# Patient Record
Sex: MALE | Race: WHITE
[De-identification: names, ages, dates, MRNs, and addresses within clinical notes are randomized per-mention and may not be internally consistent; named-entity substitution may affect disease eponyms.]

---

## 2020-10-12 ENCOUNTER — HOSPITAL ENCOUNTER (OUTPATIENT)
Dept: HOSPITAL 47 - EC | Age: 85
Setting detail: OBSERVATION
LOS: 3 days | Discharge: HOME HEALTH | End: 2020-10-15
Attending: HOSPITALIST | Admitting: HOSPITALIST
Payer: MEDICARE

## 2020-10-12 DIAGNOSIS — Z79.01: ICD-10-CM

## 2020-10-12 DIAGNOSIS — I48.0: ICD-10-CM

## 2020-10-12 DIAGNOSIS — I11.0: ICD-10-CM

## 2020-10-12 DIAGNOSIS — I50.22: ICD-10-CM

## 2020-10-12 DIAGNOSIS — Z95.1: ICD-10-CM

## 2020-10-12 DIAGNOSIS — E78.5: ICD-10-CM

## 2020-10-12 DIAGNOSIS — F32.9: ICD-10-CM

## 2020-10-12 DIAGNOSIS — Z79.82: ICD-10-CM

## 2020-10-12 DIAGNOSIS — N39.0: ICD-10-CM

## 2020-10-12 DIAGNOSIS — Z79.4: ICD-10-CM

## 2020-10-12 DIAGNOSIS — A41.51: Primary | ICD-10-CM

## 2020-10-12 DIAGNOSIS — I49.1: ICD-10-CM

## 2020-10-12 DIAGNOSIS — F03.90: ICD-10-CM

## 2020-10-12 DIAGNOSIS — G93.41: ICD-10-CM

## 2020-10-12 DIAGNOSIS — E11.9: ICD-10-CM

## 2020-10-12 DIAGNOSIS — Z95.5: ICD-10-CM

## 2020-10-12 DIAGNOSIS — E87.1: ICD-10-CM

## 2020-10-12 DIAGNOSIS — Z88.0: ICD-10-CM

## 2020-10-12 DIAGNOSIS — Z91.81: ICD-10-CM

## 2020-10-12 DIAGNOSIS — I25.10: ICD-10-CM

## 2020-10-12 DIAGNOSIS — Z79.899: ICD-10-CM

## 2020-10-12 LAB
ALBUMIN SERPL-MCNC: 4 G/DL (ref 3.5–5)
ALP SERPL-CCNC: 97 U/L (ref 38–126)
ALT SERPL-CCNC: 25 U/L (ref 4–49)
ANION GAP SERPL CALC-SCNC: 10 MMOL/L
APTT BLD: 27.4 SEC (ref 22–30)
AST SERPL-CCNC: 18 U/L (ref 17–59)
BASOPHILS # BLD AUTO: 0.2 K/UL (ref 0–0.2)
BASOPHILS NFR BLD AUTO: 1 %
BUN SERPL-SCNC: 26 MG/DL (ref 9–20)
CALCIUM SPEC-MCNC: 10.5 MG/DL (ref 8.4–10.2)
CHLORIDE SERPL-SCNC: 96 MMOL/L (ref 98–107)
CO2 SERPL-SCNC: 29 MMOL/L (ref 22–30)
EOSINOPHIL # BLD AUTO: 0.1 K/UL (ref 0–0.7)
EOSINOPHIL NFR BLD AUTO: 1 %
ERYTHROCYTE [DISTWIDTH] IN BLOOD BY AUTOMATED COUNT: 5.62 M/UL (ref 4.3–5.9)
ERYTHROCYTE [DISTWIDTH] IN BLOOD: 13.9 % (ref 11.5–15.5)
GLUCOSE BLD-MCNC: 124 MG/DL (ref 75–99)
GLUCOSE SERPL-MCNC: 155 MG/DL (ref 74–99)
GLUCOSE UR QL: (no result)
HCT VFR BLD AUTO: 51.6 % (ref 39–53)
HGB BLD-MCNC: 16.8 GM/DL (ref 13–17.5)
INR PPP: 1 (ref ?–1.2)
LYMPHOCYTES # SPEC AUTO: 0.8 K/UL (ref 1–4.8)
LYMPHOCYTES NFR SPEC AUTO: 6 %
MAGNESIUM SPEC-SCNC: 1.8 MG/DL (ref 1.6–2.3)
MCH RBC QN AUTO: 30 PG (ref 25–35)
MCHC RBC AUTO-ENTMCNC: 32.6 G/DL (ref 31–37)
MCV RBC AUTO: 91.9 FL (ref 80–100)
MONOCYTES # BLD AUTO: 0.6 K/UL (ref 0–1)
MONOCYTES NFR BLD AUTO: 4 %
NEUTROPHILS # BLD AUTO: 13 K/UL (ref 1.3–7.7)
NEUTROPHILS NFR BLD AUTO: 88 %
PH UR: 5 [PH] (ref 5–8)
PLATELET # BLD AUTO: 220 K/UL (ref 150–450)
POTASSIUM SERPL-SCNC: 4.4 MMOL/L (ref 3.5–5.1)
PROT SERPL-MCNC: 7.2 G/DL (ref 6.3–8.2)
PT BLD: 10.7 SEC (ref 9–12)
RBC UR QL: 1 /HPF (ref 0–5)
SODIUM SERPL-SCNC: 135 MMOL/L (ref 137–145)
SP GR UR: 1.01 (ref 1–1.03)
SQUAMOUS UR QL AUTO: <1 /HPF (ref 0–4)
UROBILINOGEN UR QL STRIP: <2 MG/DL (ref ?–2)
WBC # BLD AUTO: 14.9 K/UL (ref 3.8–10.6)
WBC # UR AUTO: 48 /HPF (ref 0–5)

## 2020-10-12 PROCEDURE — 83605 ASSAY OF LACTIC ACID: CPT

## 2020-10-12 PROCEDURE — 84443 ASSAY THYROID STIM HORMONE: CPT

## 2020-10-12 PROCEDURE — 99285 EMERGENCY DEPT VISIT HI MDM: CPT

## 2020-10-12 PROCEDURE — 87086 URINE CULTURE/COLONY COUNT: CPT

## 2020-10-12 PROCEDURE — 83735 ASSAY OF MAGNESIUM: CPT

## 2020-10-12 PROCEDURE — 96374 THER/PROPH/DIAG INJ IV PUSH: CPT

## 2020-10-12 PROCEDURE — 71046 X-RAY EXAM CHEST 2 VIEWS: CPT

## 2020-10-12 PROCEDURE — 80053 COMPREHEN METABOLIC PANEL: CPT

## 2020-10-12 PROCEDURE — 87077 CULTURE AEROBIC IDENTIFY: CPT

## 2020-10-12 PROCEDURE — 85610 PROTHROMBIN TIME: CPT

## 2020-10-12 PROCEDURE — 84484 ASSAY OF TROPONIN QUANT: CPT

## 2020-10-12 PROCEDURE — 93005 ELECTROCARDIOGRAM TRACING: CPT

## 2020-10-12 PROCEDURE — 97535 SELF CARE MNGMENT TRAINING: CPT

## 2020-10-12 PROCEDURE — 96376 TX/PRO/DX INJ SAME DRUG ADON: CPT

## 2020-10-12 PROCEDURE — 80048 BASIC METABOLIC PNL TOTAL CA: CPT

## 2020-10-12 PROCEDURE — 81001 URINALYSIS AUTO W/SCOPE: CPT

## 2020-10-12 PROCEDURE — 83880 ASSAY OF NATRIURETIC PEPTIDE: CPT

## 2020-10-12 PROCEDURE — 96365 THER/PROPH/DIAG IV INF INIT: CPT

## 2020-10-12 PROCEDURE — 97162 PT EVAL MOD COMPLEX 30 MIN: CPT

## 2020-10-12 PROCEDURE — 87186 SC STD MICRODIL/AGAR DIL: CPT

## 2020-10-12 PROCEDURE — 70450 CT HEAD/BRAIN W/O DYE: CPT

## 2020-10-12 PROCEDURE — 96366 THER/PROPH/DIAG IV INF ADDON: CPT

## 2020-10-12 PROCEDURE — 85730 THROMBOPLASTIN TIME PARTIAL: CPT

## 2020-10-12 PROCEDURE — 74176 CT ABD & PELVIS W/O CONTRAST: CPT

## 2020-10-12 PROCEDURE — 85025 COMPLETE CBC W/AUTO DIFF WBC: CPT

## 2020-10-12 PROCEDURE — 87040 BLOOD CULTURE FOR BACTERIA: CPT

## 2020-10-12 PROCEDURE — 36415 COLL VENOUS BLD VENIPUNCTURE: CPT

## 2020-10-12 PROCEDURE — 97166 OT EVAL MOD COMPLEX 45 MIN: CPT

## 2020-10-12 PROCEDURE — 93306 TTE W/DOPPLER COMPLETE: CPT

## 2020-10-12 RX ADMIN — IOPAMIDOL PRN ML: 612 INJECTION, SOLUTION INTRAVENOUS at 23:39

## 2020-10-12 RX ADMIN — IOPAMIDOL PRN ML: 612 INJECTION, SOLUTION INTRAVENOUS at 22:39

## 2020-10-12 NOTE — XR
EXAMINATION TYPE: XR chest 2V

 

DATE OF EXAM: 10/12/2020

 

COMPARISON: NONE

 

HISTORY: Weakness

 

TECHNIQUE: 2 views

 

FINDINGS: Heart appears slightly enlarged. There is no heart failure. There are sternal wires. There 
are chest leads. Costophrenic angles are clear. Bony thorax is intact.

 

IMPRESSION: No active cardiopulmonary disease.

## 2020-10-12 NOTE — CT
EXAMINATION TYPE: CT brain wo con

 

DATE OF EXAM: 10/12/2020

 

COMPARISON: None

 

HISTORY: Weakness.

 

CT DLP: 1078.4 mGycm

Automated exposure control for dose reduction was used.

 

Multiple axial sections were obtained of the brain without contrast.

 

There is cerebral cortical atrophy. There is no mass effect nor midline shift. There is no sign of in
tracranial hemorrhage. The calvarium is intact.

 

IMPRESSION:

Cerebral atrophy. No acute intracranial abnormality.

## 2020-10-12 NOTE — ED
General Adult HPI





- General


Chief complaint: Weakness


Stated complaint: fall, weakness


Time Seen by Provider: 10/12/20 17:00


Source: patient, family, RN notes reviewed


Mode of arrival: ambulatory


Limitations: no limitations





- History of Present Illness


Initial comments: 





Patient is a pleasant 66-year-old male presenting to the emergency department 

with fatigue and general weakness.  Patient has exertional dyspnea.  Patient 

does have history of CHF.  No leg pain or leg swelling.  No cough or fever.  

Symptoms do worsen with exertion.  Patient did have a fall and lightly bumped 

his head.  No loss of consciousness.  Patient did have a couple of accidents 

because he cannot make it to the bathroom.





- Related Data


                                Home Medications











 Medication  Instructions  Recorded  Confirmed


 


Acetaminophen/Diphenhydramine 1 tab PO HS 10/12/20 10/12/20





[Tylenol -25mg]   


 


Ammonium Lactate 12%/Aquaphor  1 applic TOPICAL DAILY PRN 10/12/20 10/12/20


 


Apixaban [Eliquis] 5 mg PO BID 10/12/20 10/12/20


 


Aspirin 81 mg PO DAILY 10/12/20 10/12/20


 


Atorvastatin [Lipitor] 40 mg PO HS 10/12/20 10/12/20


 


Canagliflozin [Invokana] 300 mg PO DAILY 10/12/20 10/12/20


 


Cholecalciferol [Vitamin D3 (25 2,000 unit PO BID 10/12/20 10/12/20





Mcg = 1000 Iu)]   


 


Donepezil [Aricept] 10 mg PO HS 10/12/20 10/12/20


 


Ferrous Sulfate [Feosol] 325 mg PO Q48H 10/12/20 10/12/20


 


Furosemide [Lasix] 40 mg PO DAILY 10/12/20 10/12/20


 


Insulin Glargine,Hum.rec.anlog 58 unit SQ DAILY 10/12/20 10/12/20





[Lantus Solostar]   


 


Ketoconazole 2% Cream [Nizoral 2%] 1 applic TOPICAL DAILY PRN 10/12/20 10/12/20


 


Memantine HCl [Memantine HCl ER] 28 mg PO DAILY 10/12/20 10/12/20


 


Nitroglycerin Sl Tabs [Nitrostat] 0.4 mg SL Q5M PRN 10/12/20 10/12/20


 


Nitroglycerin [Nitroglycerin 1 spray TRANSLINGU Q5M PRN 10/12/20 10/12/20





400MCG Spray]   


 


Omeprazole Magnesium [PriLOSEC OTC] 20 mg PO DAILY 10/12/20 10/12/20


 


Sertraline HCl [Zoloft] 50 mg PO HS 10/12/20 10/12/20


 


Sotalol [Betapace] 80 mg PO BID 10/12/20 10/12/20


 


Ubidecarenone [Co Q-10] 100 mg PO HS 10/12/20 10/12/20


 


Vitamin B Complex 1 cap PO DAILY 10/12/20 10/12/20


 


lisinopriL [Zestril] 2.5 mg PO DAILY 10/12/20 10/12/20


 


metFORMIN HCL [Glucophage] 500 mg PO DAILY 10/12/20 10/12/20


 


sitaGLIPtin [Januvia] 100 mg PO DAILY 10/12/20 10/12/20











                                    Allergies











Allergy/AdvReac Type Severity Reaction Status Date / Time


 


Penicillins Allergy  Unknown Verified 10/12/20 19:11





   Childhood  














Review of Systems


ROS Statement: 


Those systems with pertinent positive or pertinent negative responses have been 

documented in the HPI.





ROS Other: All systems not noted in ROS Statement are negative.


Constitutional: Denies: fever


Eyes: Denies: eye pain


ENT: Denies: ear pain


Respiratory: Reports: as per HPI, dyspnea (With exertion).  Denies: cough


Cardiovascular: Denies: chest pain


Endocrine: Reports: fatigue


Gastrointestinal: Denies: abdominal pain


Genitourinary: Denies: urgency


Musculoskeletal: Denies: back pain


Skin: Denies: rash


Neurological: Reports: as per HPI.  Denies: headache, confusion





Past Medical History


Past Medical History: Atrial Fibrillation, Heart Failure, Dementia, Diabetes 

Mellitus


Past Surgical History: Coronary Bypass/CABG


Additional Past Surgical History / Comment(s): Stents


Past Psychological History: Depression


Smoking Status: Never smoker


Past Alcohol Use History: None Reported


Past Drug Use History: None Reported





General Exam


Limitations: no limitations


General appearance: alert, in no apparent distress


Head exam: Present: normocephalic


Eye exam: Present: normal appearance, PERRL


Neck exam: Present: normal inspection.  Absent: tenderness


Respiratory exam: Present: normal lung sounds bilaterally


Cardiovascular Exam: Present: regular rate, normal rhythm


GI/Abdominal exam: Present: soft.  Absent: tenderness


Extremities exam: Present: normal inspection.  Absent: pedal edema, calf 

tenderness


Neurological exam: Present: alert


Psychiatric exam: Present: normal affect, normal mood


Skin exam: Present: normal color





Course


                                   Vital Signs











  10/12/20





  16:51


 


Temperature 98.8 F


 


Pulse Rate 76


 


Respiratory 18





Rate 


 


Blood Pressure 111/62


 


O2 Sat by Pulse 94 L





Oximetry 














EKG Findings





- EKG Comments:


EKG Findings:: Sinus rhythm at 72.  .  .  QT 4:30.  .  Left 

axis.  Inferior Q waves.  Q wave in lead V1.  No acute ST change.





Medical Decision Making





- Medical Decision Making





Patient reevaluated and resting comfortably in bed.  Patient and family updated 

on results and plan.  Urinalysis still pending.  Case was discussed in detail 

with Dr. Gibson, who will admit for hospital call.





- Lab Data


Result diagrams: 


                                 10/12/20 18:34





                                 10/12/20 18:34


                                   Lab Results











  10/12/20 10/12/20 10/12/20 Range/Units





  18:34 18:34 18:34 


 


WBC  14.9 H    (3.8-10.6)  k/uL


 


RBC  5.62    (4.30-5.90)  m/uL


 


Hgb  16.8    (13.0-17.5)  gm/dL


 


Hct  51.6    (39.0-53.0)  %


 


MCV  91.9    (80.0-100.0)  fL


 


MCH  30.0    (25.0-35.0)  pg


 


MCHC  32.6    (31.0-37.0)  g/dL


 


RDW  13.9    (11.5-15.5)  %


 


Plt Count  220    (150-450)  k/uL


 


Neutrophils %  88    %


 


Lymphocytes %  6    %


 


Monocytes %  4    %


 


Eosinophils %  1    %


 


Basophils %  1    %


 


Neutrophils #  13.0 H    (1.3-7.7)  k/uL


 


Lymphocytes #  0.8 L    (1.0-4.8)  k/uL


 


Monocytes #  0.6    (0-1.0)  k/uL


 


Eosinophils #  0.1    (0-0.7)  k/uL


 


Basophils #  0.2    (0-0.2)  k/uL


 


Sodium   135 L   (137-145)  mmol/L


 


Potassium   4.4   (3.5-5.1)  mmol/L


 


Chloride   96 L   ()  mmol/L


 


Carbon Dioxide   29   (22-30)  mmol/L


 


Anion Gap   10   mmol/L


 


BUN   26 H   (9-20)  mg/dL


 


Creatinine   1.07   (0.66-1.25)  mg/dL


 


Est GFR (CKD-EPI)AfAm   73   (>60 ml/min/1.73 sqM)  


 


Est GFR (CKD-EPI)NonAf   63   (>60 ml/min/1.73 sqM)  


 


Glucose   155 H   (74-99)  mg/dL


 


Plasma Lactic Acid Nando    1.5  (0.7-2.0)  mmol/L


 


Calcium   10.5 H   (8.4-10.2)  mg/dL


 


Magnesium   1.8   (1.6-2.3)  mg/dL


 


Total Bilirubin   1.0   (0.2-1.3)  mg/dL


 


AST   18   (17-59)  U/L


 


ALT   25   (4-49)  U/L


 


Alkaline Phosphatase   97   ()  U/L


 


Troponin I     (0.000-0.034)  ng/mL


 


NT-Pro-B Natriuret Pep     pg/mL


 


Total Protein   7.2   (6.3-8.2)  g/dL


 


Albumin   4.0   (3.5-5.0)  g/dL


 


TSH   1.110   (0.465-4.680)  mIU/L


 


Urine Color     


 


Urine Appearance     (Clear)  


 


Urine pH     (5.0-8.0)  


 


Ur Specific Gravity     (1.001-1.035)  


 


Urine Protein     (Negative)  


 


Urine Glucose (UA)     (Negative)  


 


Urine Ketones     (Negative)  


 


Urine Blood     (Negative)  


 


Urine Nitrite     (Negative)  


 


Urine Bilirubin     (Negative)  


 


Urine Urobilinogen     (<2.0)  mg/dL


 


Ur Leukocyte Esterase     (Negative)  


 


Urine RBC     (0-5)  /hpf


 


Urine WBC     (0-5)  /hpf


 


Ur Squamous Epith Cells     (0-4)  /hpf


 


Urine Bacteria     (None)  /hpf


 


Urine Mucus     (None)  /hpf


 


Urine Yeast (Budding)     (None)  /hpf














  10/12/20 10/12/20 10/12/20 Range/Units





  18:34 18:34 19:18 


 


WBC     (3.8-10.6)  k/uL


 


RBC     (4.30-5.90)  m/uL


 


Hgb     (13.0-17.5)  gm/dL


 


Hct     (39.0-53.0)  %


 


MCV     (80.0-100.0)  fL


 


MCH     (25.0-35.0)  pg


 


MCHC     (31.0-37.0)  g/dL


 


RDW     (11.5-15.5)  %


 


Plt Count     (150-450)  k/uL


 


Neutrophils %     %


 


Lymphocytes %     %


 


Monocytes %     %


 


Eosinophils %     %


 


Basophils %     %


 


Neutrophils #     (1.3-7.7)  k/uL


 


Lymphocytes #     (1.0-4.8)  k/uL


 


Monocytes #     (0-1.0)  k/uL


 


Eosinophils #     (0-0.7)  k/uL


 


Basophils #     (0-0.2)  k/uL


 


Sodium     (137-145)  mmol/L


 


Potassium     (3.5-5.1)  mmol/L


 


Chloride     ()  mmol/L


 


Carbon Dioxide     (22-30)  mmol/L


 


Anion Gap     mmol/L


 


BUN     (9-20)  mg/dL


 


Creatinine     (0.66-1.25)  mg/dL


 


Est GFR (CKD-EPI)AfAm     (>60 ml/min/1.73 sqM)  


 


Est GFR (CKD-EPI)NonAf     (>60 ml/min/1.73 sqM)  


 


Glucose     (74-99)  mg/dL


 


Plasma Lactic Acid Nando     (0.7-2.0)  mmol/L


 


Calcium     (8.4-10.2)  mg/dL


 


Magnesium     (1.6-2.3)  mg/dL


 


Total Bilirubin     (0.2-1.3)  mg/dL


 


AST     (17-59)  U/L


 


ALT     (4-49)  U/L


 


Alkaline Phosphatase     ()  U/L


 


Troponin I  0.016    (0.000-0.034)  ng/mL


 


NT-Pro-B Natriuret Pep   1210   pg/mL


 


Total Protein     (6.3-8.2)  g/dL


 


Albumin     (3.5-5.0)  g/dL


 


TSH     (0.465-4.680)  mIU/L


 


Urine Color    Yellow  


 


Urine Appearance    Cloudy  (Clear)  


 


Urine pH    5.0  (5.0-8.0)  


 


Ur Specific Gravity    1.014  (1.001-1.035)  


 


Urine Protein    Negative  (Negative)  


 


Urine Glucose (UA)    4+ H  (Negative)  


 


Urine Ketones    Negative  (Negative)  


 


Urine Blood    Negative  (Negative)  


 


Urine Nitrite    Negative  (Negative)  


 


Urine Bilirubin    Negative  (Negative)  


 


Urine Urobilinogen    <2.0  (<2.0)  mg/dL


 


Ur Leukocyte Esterase    Large H  (Negative)  


 


Urine RBC    1  (0-5)  /hpf


 


Urine WBC    48 H  (0-5)  /hpf


 


Ur Squamous Epith Cells    <1  (0-4)  /hpf


 


Urine Bacteria    Many H  (None)  /hpf


 


Urine Mucus    Rare H  (None)  /hpf


 


Urine Yeast (Budding)    Occasional H  (None)  /hpf














- Radiology Data


Radiology results: report reviewed (Computed tomography scan of the brain shows 

atrophy.  No acute process.), image reviewed (Chest x-ray shows no acute 

process.  )





Disposition


Clinical Impression: 


 Weakness, Ataxia, Urinary tract infection





Disposition: ADMITTED AS IP TO THIS HOSP


Is patient prescribed a controlled substance at d/c from ED?: No


Referrals: 


Nonstaff,Physician [Primary Care Provider] - 1-2 days


Decision Time: 19:54

## 2020-10-13 LAB
ANION GAP SERPL CALC-SCNC: 11.2 MMOL/L (ref 4–12)
BASOPHILS # BLD AUTO: 0.1 K/UL (ref 0–0.2)
BASOPHILS NFR BLD AUTO: 1 %
BUN SERPL-SCNC: 29 MG/DL (ref 9–27)
BUN/CREAT SERPL: 29 RATIO (ref 12–20)
CALCIUM SPEC-MCNC: 10.1 MG/DL (ref 8.7–10.3)
CHLORIDE SERPL-SCNC: 97 MMOL/L (ref 96–109)
CO2 SERPL-SCNC: 26.8 MMOL/L (ref 21.6–31.8)
EOSINOPHIL # BLD AUTO: 0 K/UL (ref 0–0.7)
EOSINOPHIL NFR BLD AUTO: 0 %
ERYTHROCYTE [DISTWIDTH] IN BLOOD BY AUTOMATED COUNT: 5.36 M/UL (ref 4.3–5.9)
ERYTHROCYTE [DISTWIDTH] IN BLOOD: 14.1 % (ref 11.5–15.5)
GLUCOSE BLD-MCNC: 114 MG/DL (ref 75–99)
GLUCOSE BLD-MCNC: 114 MG/DL (ref 75–99)
GLUCOSE BLD-MCNC: 125 MG/DL (ref 75–99)
GLUCOSE BLD-MCNC: 191 MG/DL (ref 75–99)
GLUCOSE SERPL-MCNC: 113 MG/DL (ref 70–110)
HCT VFR BLD AUTO: 49.8 % (ref 39–53)
HGB BLD-MCNC: 15.7 GM/DL (ref 13–17.5)
LYMPHOCYTES # SPEC AUTO: 1 K/UL (ref 1–4.8)
LYMPHOCYTES NFR SPEC AUTO: 9 %
MCH RBC QN AUTO: 29.3 PG (ref 25–35)
MCHC RBC AUTO-ENTMCNC: 31.6 G/DL (ref 31–37)
MCV RBC AUTO: 92.9 FL (ref 80–100)
MONOCYTES # BLD AUTO: 0.6 K/UL (ref 0–1)
MONOCYTES NFR BLD AUTO: 6 %
NEUTROPHILS # BLD AUTO: 8.8 K/UL (ref 1.3–7.7)
NEUTROPHILS NFR BLD AUTO: 82 %
PLATELET # BLD AUTO: 212 K/UL (ref 150–450)
POTASSIUM SERPL-SCNC: 4.1 MMOL/L (ref 3.5–5.5)
SODIUM SERPL-SCNC: 135 MMOL/L (ref 135–145)
WBC # BLD AUTO: 10.6 K/UL (ref 3.8–10.6)

## 2020-10-13 RX ADMIN — INSULIN DETEMIR SCH UNIT: 100 INJECTION, SOLUTION SUBCUTANEOUS at 08:53

## 2020-10-13 RX ADMIN — APIXABAN SCH MG: 2.5 TABLET, FILM COATED ORAL at 21:36

## 2020-10-13 RX ADMIN — ASPIRIN SCH: 325 TABLET ORAL at 09:08

## 2020-10-13 RX ADMIN — INSULIN ASPART SCH: 100 INJECTION, SOLUTION INTRAVENOUS; SUBCUTANEOUS at 11:37

## 2020-10-13 RX ADMIN — ATORVASTATIN CALCIUM SCH MG: 40 TABLET, FILM COATED ORAL at 21:37

## 2020-10-13 RX ADMIN — INSULIN ASPART SCH: 100 INJECTION, SOLUTION INTRAVENOUS; SUBCUTANEOUS at 17:22

## 2020-10-13 RX ADMIN — FUROSEMIDE SCH MG: 40 TABLET ORAL at 08:54

## 2020-10-13 RX ADMIN — DONEPEZIL HYDROCHLORIDE SCH MG: 10 TABLET ORAL at 21:37

## 2020-10-13 RX ADMIN — SERTRALINE HYDROCHLORIDE SCH MG: 50 TABLET, FILM COATED ORAL at 21:36

## 2020-10-13 RX ADMIN — INSULIN ASPART SCH: 100 INJECTION, SOLUTION INTRAVENOUS; SUBCUTANEOUS at 08:07

## 2020-10-13 RX ADMIN — CEFAZOLIN SCH MLS/HR: 330 INJECTION, POWDER, FOR SOLUTION INTRAMUSCULAR; INTRAVENOUS at 21:38

## 2020-10-13 RX ADMIN — Medication SCH: at 00:55

## 2020-10-13 RX ADMIN — PANTOPRAZOLE SODIUM SCH MG: 40 TABLET, DELAYED RELEASE ORAL at 08:54

## 2020-10-13 RX ADMIN — SOTALOL HYDROCHLORIDE SCH MG: 80 TABLET ORAL at 08:55

## 2020-10-13 RX ADMIN — CEFAZOLIN SCH: 330 INJECTION, POWDER, FOR SOLUTION INTRAMUSCULAR; INTRAVENOUS at 06:04

## 2020-10-13 RX ADMIN — LINAGLIPTIN SCH MG: 5 TABLET, FILM COATED ORAL at 08:54

## 2020-10-13 RX ADMIN — ASPIRIN 81 MG CHEWABLE TABLET SCH MG: 81 TABLET CHEWABLE at 08:54

## 2020-10-13 RX ADMIN — INSULIN ASPART SCH UNIT: 100 INJECTION, SOLUTION INTRAVENOUS; SUBCUTANEOUS at 21:37

## 2020-10-13 RX ADMIN — Medication SCH UNIT: at 21:37

## 2020-10-13 RX ADMIN — Medication SCH UNIT: at 08:54

## 2020-10-13 RX ADMIN — MEMANTINE HYDROCHLORIDE SCH MG: 10 TABLET ORAL at 21:37

## 2020-10-13 RX ADMIN — SOTALOL HYDROCHLORIDE SCH MG: 80 TABLET ORAL at 21:37

## 2020-10-13 RX ADMIN — Medication SCH MG: at 21:37

## 2020-10-13 RX ADMIN — MEMANTINE HYDROCHLORIDE SCH MG: 10 TABLET ORAL at 08:54

## 2020-10-13 NOTE — P.CRDCN
History of Present Illness


Consult date: 10/13/20


Consult reason: congestive heart failure


Chief complaint: Fatigue and weakness, dizziness, questionable fall


History of present illness: 


This is a pleasant 86-year-old  gentleman with documented history of 

hypertension, diabetes, chronic heart failure, atrial fibrillation, dementia, 

coronary artery disease with prior bypass surgery and stent placement, 

hyperlipidemia, who presented to the hospital with symptoms of dizziness, 

fatigue and weakness.  Patient also states he is unsure but he may have had a 

fall.  He does answer some questions appropriately, but is somewhat confused on 

other topics.  He states that he follows with Dr. Jeffries out of town as his 

cardiologist.  CAT scan of the brain did not reveal any acute abnormality on 

presentation here, chest x-ray did not reveal any acute process EKG showed 

normal sinus rhythm with PACs.  Abdominal pelvis CT showed some bladder wall 

thickening.  Positive UTI.  Blood pressure 110/80 with a heart rate in the 70s 

93% on room air.  White blood cell count 14.9, hemoglobin 16.8, platelet count 

220 this morning 10.6 15.7 and 212.  Sodium 135, potassium 4.1, BUN 29 

creatinine 1.0 troponins 0.01 6.01 9.020 and a BNP level of 1210.  Cardiology 

consultation was requested for congestive heart failure.  Patient is lying 

comfortably in bed this morning at the time of examination, no shortness of 

breath.








Past Medical History


Past Medical History: Atrial Fibrillation, Heart Failure, Dementia, Diabetes 

Mellitus


Additional Past Medical History / Comment(s): CHF


History of Any Multi-Drug Resistant Organisms: None Reported


Past Surgical History: Coronary Bypass/CABG


Additional Past Surgical History / Comment(s): Stents


Past Anesthesia/Blood Transfusion Reactions: No Reported Reaction


Past Psychological History: Depression


Smoking Status: Never smoker


Past Alcohol Use History: None Reported


Past Drug Use History: None Reported





- Past Family History


  ** Father


History Unknown: Yes





Medications and Allergies


                                Home Medications











 Medication  Instructions  Recorded  Confirmed  Type


 


Acetaminophen/Diphenhydramine 1 tab PO HS 10/12/20 10/12/20 History





[Tylenol -25mg]    


 


Ammonium Lactate 12%/Aquaphor  1 applic TOPICAL DAILY PRN 10/12/20 10/12/20 

History


 


Apixaban [Eliquis] 5 mg PO BID 10/12/20 10/12/20 History


 


Aspirin 81 mg PO DAILY 10/12/20 10/12/20 History


 


Atorvastatin [Lipitor] 40 mg PO HS 10/12/20 10/12/20 History


 


Canagliflozin [Invokana] 300 mg PO DAILY 10/12/20 10/12/20 History


 


Cholecalciferol [Vitamin D3 (25 2,000 unit PO BID 10/12/20 10/12/20 History





Mcg = 1000 Iu)]    


 


Donepezil [Aricept] 10 mg PO HS 10/12/20 10/12/20 History


 


Ferrous Sulfate [Feosol] 325 mg PO Q48H 10/12/20 10/12/20 History


 


Furosemide [Lasix] 40 mg PO DAILY 10/12/20 10/12/20 History


 


Insulin Glargine,Hum.rec.anlog 58 unit SQ DAILY 10/12/20 10/12/20 History





[Lantus Solostar]    


 


Ketoconazole 2% Cream [Nizoral 2%] 1 applic TOPICAL DAILY PRN 10/12/20 10/12/20 

History


 


Memantine HCl [Memantine HCl ER] 28 mg PO DAILY 10/12/20 10/12/20 History


 


Nitroglycerin Sl Tabs [Nitrostat] 0.4 mg SL Q5M PRN 10/12/20 10/12/20 History


 


Nitroglycerin [Nitroglycerin 1 spray TRANSLINGU Q5M PRN 10/12/20 10/12/20 

History





400MCG Spray]    


 


Omeprazole Magnesium [PriLOSEC OTC] 20 mg PO DAILY 10/12/20 10/12/20 History


 


Sertraline HCl [Zoloft] 50 mg PO HS 10/12/20 10/12/20 History


 


Sotalol [Betapace] 80 mg PO BID 10/12/20 10/12/20 History


 


Ubidecarenone [Co Q-10] 100 mg PO HS 10/12/20 10/12/20 History


 


Vitamin B Complex 1 cap PO DAILY 10/12/20 10/12/20 History


 


lisinopriL [Zestril] 2.5 mg PO DAILY 10/12/20 10/12/20 History


 


metFORMIN HCL [Glucophage] 500 mg PO DAILY 10/12/20 10/12/20 History


 


sitaGLIPtin [Januvia] 100 mg PO DAILY 10/12/20 10/12/20 History








                                    Allergies











Allergy/AdvReac Type Severity Reaction Status Date / Time


 


Penicillins Allergy  Unknown Verified 10/12/20 19:11





   Childhood  














Physical Exam


Vitals: 


                                   Vital Signs











  Temp Pulse Pulse Resp BP BP BP


 


 10/13/20 07:00  98.2 F   76    110/86 


 


 10/13/20 04:00     17   


 


 10/13/20 01:00  98 F   74  20   106/83 


 


 10/12/20 23:39     18   


 


 10/12/20 22:10  98.9 F   63  20    113/63


 


 10/12/20 21:30     18   


 


 10/12/20 21:01  98.8 F      


 


 10/12/20 20:00   88   20  114/72  


 


 10/12/20 18:59   70   20  149/65  


 


 10/12/20 16:51  98.8 F  76   18  111/62  














  Pulse Ox


 


 10/13/20 07:00  93 L


 


 10/13/20 04:00 


 


 10/13/20 01:00  92 L


 


 10/12/20 23:39 


 


 10/12/20 22:10  94 L


 


 10/12/20 21:30 


 


 10/12/20 21:01 


 


 10/12/20 20:00  98


 


 10/12/20 18:59  94 L


 


 10/12/20 16:51  94 L








                                Intake and Output











 10/12/20 10/13/20 10/13/20





 22:59 06:59 14:59


 


Intake Total 100 100 


 


Output Total   250


 


Balance 100 100 -250


 


Intake:   


 


  Oral 100 100 


 


Output:   


 


  Urine   250


 


Other:   


 


  Voiding Method Urinal Urinal Urinal


 


  # Voids 2 2 


 


  Weight 81.647 kg 80 kg 














PHYSICAL EXAMINATION: 





GENERAL: 86-year-old  gentleman in no acute distress at the time of my 

examination





HEENT: Head is atraumatic, normocephalic.  Pupils equal, round.  Sclera 

anicteric. Conjunctiva are clear.  Mucous membranes of the mouth are moist.  

Neck is supple.  There is no elevated jugular venous pressure.  No carotid  

bruit is heard.





HEART EXAMINATION: S1-S2 with systolic murmur is heard





CHEST EXAMINATION: Lungs are clear to auscultation and precussion. No chest wall

 tenderness is noted on palpation or with deep breathing.





ABDOMEN:  Soft, nontender. Bowel sounds are heard. No organomegaly noted.


 


EXTREMITIES: 2+ peripheral pulses with no evidence of peripheral edema and no 

calf tenderness noted.





NEUROLOGIC patient is awake, alert and oriented 2 .


 


.


 








Results





                                 10/13/20 05:31





                                 10/13/20 05:31


                                 Cardiac Enzymes











  10/12/20 10/12/20 10/12/20 Range/Units





  18:34 18:34 21:10 


 


AST  18    (17-59)  U/L


 


Troponin I   0.016  0.019  (0.000-0.034)  ng/mL














  10/13/20 Range/Units





  00:59 


 


AST   (17-59)  U/L


 


Troponin I  0.020  (0.000-0.034)  ng/mL








                                   Coagulation











  10/12/20 Range/Units





  18:34 


 


PT  10.7  (9.0-12.0)  sec


 


APTT  27.4  (22.0-30.0)  sec








                                       CBC











  10/12/20 10/13/20 Range/Units





  18:34 05:31 


 


WBC  14.9 H  10.6  (3.8-10.6)  k/uL


 


RBC  5.62  5.36  (4.30-5.90)  m/uL


 


Hgb  16.8  15.7  (13.0-17.5)  gm/dL


 


Hct  51.6  49.8  (39.0-53.0)  %


 


Plt Count  220  212  (150-450)  k/uL








                          Comprehensive Metabolic Panel











  10/12/20 10/13/20 Range/Units





  18:34 05:31 


 


Sodium  135 L  135  (137-145)  mmol/L


 


Potassium  4.4  4.1  (3.5-5.1)  mmol/L


 


Chloride  96 L  97  ()  mmol/L


 


Carbon Dioxide  29  26.8  (22-30)  mmol/L


 


BUN  26 H  29.0 H  (9-20)  mg/dL


 


Creatinine  1.07  1.0  (0.66-1.25)  mg/dL


 


Glucose  155 H  113 H  (74-99)  mg/dL


 


Calcium  10.5 H  10.1  (8.4-10.2)  mg/dL


 


AST  18   (17-59)  U/L


 


ALT  25   (4-49)  U/L


 


Alkaline Phosphatase  97   ()  U/L


 


Total Protein  7.2   (6.3-8.2)  g/dL


 


Albumin  4.0   (3.5-5.0)  g/dL








                               Current Medications











Generic Name Dose Route Start Last Admin





  Trade Name Freq  PRN Reason Stop Dose Admin


 


Apixaban  2.5 mg  10/13/20 21:00 





  Apixaban 2.5 Mg Tablet  PO  





  BID GREGG  


 


Aspirin  81 mg  10/13/20 09:00  10/13/20 08:54





  Aspirin 81 Mg  PO   81 mg





  DAILY GREGG   Administration


 


Atorvastatin Calcium  40 mg  10/13/20 21:00 





  Atorvastatin 40 Mg Tab  PO  





  HS GREGG  


 


Cholecalciferol  2,000 unit  10/13/20 09:00  10/13/20 08:54





  Cholecalciferol 1,000 Unit Tab  PO   2,000 unit





  BID GREGG   Administration


 


Clotrimazole  1 applic  10/12/20 21:31 





  Clotrimazole 1% Cream 15 Gm Tube  TOPICAL  





  DAILY PRN  





  rash on leg  


 


Donepezil HCl  10 mg  10/13/20 21:00 





  Donepezil 10 Mg Tab  PO  





  HS [facility]  


 


Ferrous Sulfate  325 mg  10/14/20 09:00 





  Ferrous Sulfate 325 Mg Tab  PO  





  Q48H [facility]  


 


Furosemide  40 mg  10/13/20 09:00  10/13/20 08:54





  Furosemide 40 Mg Tab  PO   40 mg





  DAILY [facility]   Administration


 


Sodium Chloride  1,000 mls @ 20 mls/hr  10/12/20 20:00  10/13/20 06:04





  Saline 0.9%  IV   Not Given





  .Q24H [facility]  


 


Ceftriaxone Sodium 1 gm/  50 mls @ 100 mls/hr  10/13/20 09:00  10/13/20 08:54





  Sodium Chloride  IVPB   100 mls/hr





  Q12HR [facility]   Administration


 


Insulin Aspart  0 unit  10/13/20 07:30  10/13/20 11:37





  Insulin Aspart (Novolog) 100 Unit/Ml Vial  SQ   Not Given





  [facility]  





  Protocol  


 


Insulin Detemir  58 unit  10/13/20 07:00  10/13/20 08:53





  Insulin Detemir (Levemir) 100 Unit/Ml Syr  SQ   58 unit





  DAILY@0700 [facility]   Administration


 


Iopamidol  30 ml  10/12/20 21:36 





  Iopamidol Contrast (Oral Use) Vial  PO  10/13/20 21:36 





  ONCE PRN  





  CT Scan  


 


Lactic Acid  1 applic  10/12/20 21:31 





  Ammonium Lactate 12% Cream 140 Gm Tube  TOPICAL  





  DAILY PRN  





  rash on leg  


 


Linagliptin  5 mg  10/13/20 09:00  10/13/20 08:54





  Linagliptin 5 Mg Tablet  PO   5 mg





  DAILY [facility]   Administration


 


Lisinopril  2.5 mg  10/13/20 09:00  10/13/20 08:54





  Lisinopril 2.5 Mg Tab  PO   2.5 mg





  DAILY GREGG   Administration


 


Melatonin  1 mg  10/12/20 22:30  10/13/20 00:55





  Melatonin 1 Mg Tab  PO   Not Given





  HS GREGG  


 


Memantine  10 mg  10/13/20 09:00  10/13/20 08:54





  Memantine 10 Mg Tab  PO   10 mg





  BID GREGG   Administration


 


Metformin HCl  500 mg  10/15/20 09:00 





  Metformin 500 Mg Tab  PO  





  DAILY GREGG  


 


Naloxone HCl  0.2 mg  10/12/20 19:54 





  Naloxone 0.4 Mg/Ml 1 Ml Vial  IV  





  Q2M PRN  





  Opioid Reversal  


 


Nitroglycerin  0.4 mg  10/12/20 21:31 





  Nitroglycerin Sl Tabs 0.4 Mg Tab  SUBLINGUAL  





  Q5M PRN  





  Chest Pain  


 


Patient's Own (  300 mg  10/13/20 09:00  10/13/20 09:08





Canagliflozin [  PO   Not Given





Invokana] 300 Mg  DAILY GREGG  





Tablet)   


 


Pantoprazole Sodium  40 mg  10/13/20 07:30  10/13/20 08:54





  Pantoprazole 40 Mg Tablet  PO   40 mg





  0730 GREGG   Administration


 


Sertraline HCl  50 mg  10/13/20 21:00 





  Sertraline 50 Mg Tab  PO  





  HS GREGG  


 


Sotalol HCl  80 mg  10/13/20 09:00  10/13/20 08:55





  Sotalol 80 Mg Tab  PO   80 mg





  BID GREGG   Administration








                                Intake and Output











 10/12/20 10/13/20 10/13/20





 22:59 06:59 14:59


 


Intake Total 100 100 


 


Output Total   250


 


Balance 100 100 -250


 


Intake:   


 


  Oral 100 100 


 


Output:   


 


  Urine   250


 


Other:   


 


  Voiding Method Urinal Urinal Urinal


 


  # Voids 2 2 


 


  Weight 81.647 kg 80 kg 








                                        





                                 10/13/20 05:31 





                                 10/13/20 05:31 











EKG Interpretations (text)


EKG shows a normal sinus rhythm with occasional PACs








Assessment and Plan


Plan: 


Assessment and plan


#1 UTI/sepsis


#2 symptoms of dizziness, fatigue and weakness, could be secondary to UTI


#3 no clear-cut evidence of congestive cardiac failure


#4 paroxysmal atrial fibrillation


#5 dementia


#6 diabetes


#7 hypertension


#8 hyperlipidemia


#9 coronary artery disease with prior bypass surgery and stenting





Plan


We will obtain an echocardiogram with Doppler study.  Decrease the patient's 

Eliquis 2-1/2 mg one tablet by mouth twice a day.  We will continue with the 

oral diuretics.  Further recommendations to follow.





DNP note has been reviewed, I agree with a documented findings and plan of care.

 Patient was seen and examined.

## 2020-10-13 NOTE — HP
HISTORY AND PHYSICAL



CHIEF COMPLAINTS:

Weakness, shortness of breath, fall.



HISTORY OF PRESENT ILLNESS:

This 86-year-old gentleman with a past medical history of multiple medical problems

including history of atrial fibrillation, CHF, dementia, diabetes mellitus, CAD, CABG,

stents, being followed by primary physician and Cardiology elsewhere recently moved to

the area. The patient is complaining of some fatigue and generalized weakness and

shortness of breath.  The patient had multiple falls also.  The patient came to [facility].  The patient also noted abdominal distention and the patient admitted

for further evaluation and treatment. At the time of admission, UA showed abnormality

indicating possible UTI and sepsis.  Otherwise, chest x-ray showed no significant fluid

overload, but NT proBNP is elevated at 1210. There is no history of any fever or

rigors. No history of headache, loss of consciousness, seizures.  Patient is unable to

give coherent history.  Most of the history is taken from my discussion with staff,

discussion with the ER physician and review of the chart and discussion with the

family, the daughter, at the bedside.



PAST MEDICAL HISTORY:

History of atrial fibrillation, CHF, dementia, diabetes mellitus type 2, CAD CABG.



MEDICATIONS:

Home medications are:

1. Nizoral.

2. Ammonia lactate.

3. Januvia.

4. Glucophage.

5. Zestril.

6. Vitamin B complex.

7. Coenzyme Q10.

8. Betapace.

9. Zoloft.

10.Prilosec.

11.Nitrostat.

12.Nitroglycerin.

13.Namenda.

14.Lantus.

15.Lasix.

16.Iron Sulfate.

17.Aricept.

18.Vitamin D3.

19.Invokana.

20.Lipitor.

21.Aspirin.

22.Eliquis.

23.Tylenol.



ALLERGIES:

PENICILLIN.



FAMILY HISTORY:

No history of heart disease or strokes in the family.



SOCIAL HISTORY:

No history of smoking.  No history of alcohol.



REVIEW OF SYSTEMS:

Could not be taken, the patient is mildly confused.



PHYSICAL EXAMINATION:

Pulse 76, blood pressure 111/62, respiration 18, temperature 98.8, pulse ox 94% on 2 L.

HEENT:  Conjunctivae normal.  Oral mucosa moist.

NECK: No jugular venous distention. No carotid bruit. No lymph node enlargement.

CARDIOVASCULAR: S1, S2 muffled.

RESPIRATORY:  Breath sounds diminished at the bases. A few scattered rhonchi.  No

crackles.

ABDOMEN:  Soft.  Diffuse mild distention. Nontender. No mass palpable.

LEGS: No edema, no swelling.

NERVOUS SYSTEM: Higher functions as mentioned earlier. Moves all 4 limbs.  No focal

motor or sensory deficit.

LYMPHATICS:  No lymphadenopathy of the neck, axillae or groin.

SKIN:  No ulcer, rash or bleeding.

JOINTS: No active deforming arthropathy.



LABS:

Labs are at this time show WBC 14.9, sodium 135, potassium 4.4.  Other labs are noted.

UA noted.



ASSESSMENT:

1. Possible acute urinary tract infection with sepsis, present on admission.

2. Increased WBC.

3. Change in mental status acute on chronic metabolic encephalopathy.

4. Hyponatremia.

5. History of congestive heart failure, ejection fraction unknown.

6. History of atrial fibrillation, paroxysmal.

7. History of dementia.

8. Diabetes mellitus type 2.

9. History of coronary artery disease, coronary artery bypass grafting, stents.

10.History of depression.

11.FULL CODE.



RECOMMENDATIONS AND DISCUSSION:

This 86-year-old gentleman who presented with multiple complex medical issues, we will

monitor the patient closely.  We will initiate broad-spectrum IV antibiotics.

Cultures, PT, OT evaluation.  I would also recommend a CAT scan of the abdomen and

pelvis also.  Other than that, I would recommend Cardiology consultation and continue

to monitor.  Prognosis guarded because of multiple complex medical issues. Further

recommendations to follow. Discussed with the family.





ANNMARIE / SARAH: [id number] / Job#: [id number]

## 2020-10-13 NOTE — PN
PROGRESS NOTE



DATE OF SERVICE:

10/13/2020



This 86-year-old gentleman who was admitted with acute UTI continues to be 
confused.

Patient has features of possible sepsis.  The patient had a CT scan of the 
abdomen and

pelvis which showed bladder wall thickening and complete distention versus 
cystitis

also.  Otherwise, no other recent inflammatory process noted. Past medical 
history

reviewed. Review of systems could not be taken; the patient was confused.



CURRENT MEDICATIONS:

Reviewed. They include:

1. Eliquis 2.5 mg b.i.d.  doses are reviewed.

2. Aspirin.

3. Lipitor.

4. Rocephin 1 gram.

5. Clotrimazole.

6. Aricept.

7. Iron sulfate.

8. Lasix.

9. NovoLog.

10.Levemir.

11.Tradjenta.

12.Zestril.

13.Melatonin.

14.Namenda.

15.Glucophage.

16.Narcan.

17.Nitrostat.

18.Protonix.

19.Zoloft.

20.Betapace.



PHYSICAL EXAMINATION:

Patient is alert  pulse 76, blood pressure 110/83, respiration 17, temperature

98.2, pulse ox 93% on room air.

HEENT: Conjunctivae normal.

NECK: No jugular venous distention.

CARDIOVASCULAR SYSTEM:  S1, S2 muffled.

RESPIRATORY SYSTEM: Breath sounds diminished at the bases.  A few scattered 
rhonchi and

crackles.

ABDOMEN: Soft. Mild diffuse distention. No mass palpable. Mild diffuse 
discomfort on

palpation, especially in the lower abdomen. No guarding. No rigidity. No 
ascites. Bowel

sounds present.

LEGS: No edema. No swelling.

NERVOUS SYSTEM: Higher functions as mentioned earlier. Moves all 4 limbs. No 
focal

motor or sensory deficit.

LYMPHATICS: No lymph node palpable in neck, axillae or groin.

SKIN: No ulcer, rash, bleeding.

JOINTS: No active deforming arthropathy.



LABS:

CBC within normal limits. Sodium 135, potassium 4.1. Glucose 113.  Other labs 
are

noted.



ASSESSMENT:

1. Acute urinary tract infection with sepsis, present on admission.

2. Increased white count.

3. Change in mental status, acute metabolic encephalopathy secondary to urinary 
tract

    infection and sepsis.

4. Hyponatremia.

5. History of congestive heart failure, ejection fraction unknown.

6. History of atrial fibrillation, paroxysmal.

7. History of dementia.

8. Diabetes mellitus, type 2.

9. History of coronary artery disease, coronary artery bypass grafting, stent.

10.History of depression.

11.FULL CODE.



RECOMMENDATIONS AND DISCUSSION:

I recommend to continue current medications, continue with the monitoring, 
symptomatic

treatment. I reviewed the chest x-ray personally.  There is no evidence of any 
CHF

currently.  However, continue the antibiotics.  Follow the cultures.  The 
patient had

possibly cystitis in the CT of the abdomen.  We will continue the diuretics.  
Continue

the rest of the medications.  Repeat labs.  Symptomatic treatment.  Further

recommendations to follow.





MMODL / IJN: [id number] / Job#: [id number]

MTDD

## 2020-10-13 NOTE — CT
EXAM:

  CT Abdomen and Pelvis Without Intravenous Contrast

 

CLINICAL HISTORY:

  ITS.REASON CT Reason: uti

 

TECHNIQUE:

  Axial computed tomography images of the abdomen and pelvis without 

intravenous contrast.  CTDI is 14.37 mGy and DLP is 778.6 mGy-cm.  This 

CT exam was performed using one or more of the following dose reduction 

techniques: automated exposure control, adjustment of the mA and/or kV 

according to patient size, and/or use of iterative reconstruction 

technique.

 

COMPARISON:

  No relevant prior studies available.

 

FINDINGS:

  Limitations:  Motion artifact.

  Lung bases:  Unremarkable.  No mass.  No consolidation.

 

 ABDOMEN:

  Liver:  Unremarkable.

  Gallbladder and bile ducts:  Cholelithiasis without evidence of acute 

cholecystitis or biliary dilatation.

  Pancreas:  Unremarkable.  No ductal dilation.

  Spleen:  Unremarkable.  No splenomegaly.

  Adrenals:  Unremarkable.  No mass.

  Kidneys and ureters:  No hydronephrosis or radiopaque urinary stones.

  Stomach and bowel:  No bowel obstruction or inflammation.  No mucosal 

thickening.

 

 PELVIS:

  Appendix:  Normal appendix.

  Bladder:  Urinary bladder is partially decompressed, with bladder wall 

thickening.  No stones.

  Reproductive:  Normal sized prostate gland with parenchymal 

calcifications.

 

 ABDOMEN and PELVIS:

  Intraperitoneal space:  No free fluid or free air.

  Bones/joints:  Lumbar spondylosis.  No acute fracture or dislocation.

  Soft tissues:  Small fat-containing inguinal hernias.

  Vasculature:  Aortoiliac atherosclerosis without aneurysm.

  Lymph nodes:  Unremarkable.  No enlarged lymph nodes.

 

IMPRESSION:     

1.  Bladder wall thickening, incomplete distention versus cystitis.  

Correlate with urinalysis.

2.  Otherwise, no evidence of acute or inflammatory process.

## 2020-10-13 NOTE — ECHOF
Referral Reason:chf



MEASUREMENTS

--------

HEIGHT: 172.7 cm

WEIGHT: 79.8 kg

BP: 106/83

RVIDd:   4.6 cm     (< 3.3)

IVSd:   1.7 cm     (0.6 - 1.1)

LVIDd:   4.5 cm     (3.9 - 5.3)

LVPWd:   1.8 cm     (0.6 - 1.1)

IVSs:   2.3 cm

LVIDs:   3.3 cm

LVPWs:   2.1 cm

LAESV Index (A-L):   28.41 ml/m

Ao Diam:   2.9 cm     (2.0 - 3.7)

AV Cusp:   1.5 cm     (1.5 - 2.6)

MV EXCURSION:   11.676 mm     (> 18.000)

MV EF SLOPE:   25 mm/s     (70 - 150)

EPSS:   1.0 cm

MV E Sukhwinder:   0.55 m/s

MV DecT:   190 ms

MV A Sukhwinder:   0.89 m/s

MV E/A Ratio:   0.62 

RAP:   5.00 mmHg

RVSP:   15.06 mmHg







FINDINGS

--------

Sinus rhythm.

This was a technically difficult study with suboptimal views.

The left ventricular size is normal.   There is moderate concentric left ventricular hypertrophy.   O
verall left ventricular systolic function is mild-moderately impaired with, an EF between 40 - 45 %.

The right ventricle is moderately enlarged.

Normal LA  size by volume 22+/-6 ml/m2.

The right atrium was not well visualized.

5.0mg of Lumason was utilized for enhancement of images

There is moderate aortic valve sclerosis.   There is no evidence of aortic regurgitation.   There is 
no evidence of aortic stenosis.

Mild mitral annular calcification present.   Mild mitral regurgitation is present.

Mild tricuspid regurgitation present.   There is no evidence of pulmonary hypertension.   The right v
entricular systolic pressure, as measured by Doppler, is 15.06mmHg.

The pulmonic valve was not well visualized.   There is no pulmonic regurgitation present.

The aortic root size is normal.

IVC Not well visulized.

There is no pericardial effusion.



CONCLUSIONS

--------

1. There is moderate concentric left ventricular hypertrophy.

2. Overall left ventricular systolic function is mild-moderately impaired with, an EF between 40 - 45
 %.

3. The right ventricle is moderately enlarged.

4. Normal LA size by volume 22+/-6 ml/m2.

5. There is moderate aortic valve sclerosis.

6. Mild mitral annular calcification present.

7. Mild mitral regurgitation is present.

8. Mild tricuspid regurgitation present.

9. There is no pericardial effusion.





SONOGRAPHER: Shannan Carrillo RDCS

## 2020-10-14 LAB
ANION GAP SERPL CALC-SCNC: 10.4 MMOL/L (ref 4–12)
BASOPHILS # BLD AUTO: 0.1 K/UL (ref 0–0.2)
BASOPHILS NFR BLD AUTO: 1 %
BUN SERPL-SCNC: 38 MG/DL (ref 9–27)
BUN/CREAT SERPL: 31.67 RATIO (ref 12–20)
CALCIUM SPEC-MCNC: 10.1 MG/DL (ref 8.7–10.3)
CHLORIDE SERPL-SCNC: 97 MMOL/L (ref 96–109)
CO2 SERPL-SCNC: 26.6 MMOL/L (ref 21.6–31.8)
EOSINOPHIL # BLD AUTO: 0.2 K/UL (ref 0–0.7)
EOSINOPHIL NFR BLD AUTO: 2 %
ERYTHROCYTE [DISTWIDTH] IN BLOOD BY AUTOMATED COUNT: 5.02 M/UL (ref 4.3–5.9)
ERYTHROCYTE [DISTWIDTH] IN BLOOD: 14.1 % (ref 11.5–15.5)
GLUCOSE BLD-MCNC: 127 MG/DL (ref 75–99)
GLUCOSE BLD-MCNC: 150 MG/DL (ref 75–99)
GLUCOSE BLD-MCNC: 151 MG/DL (ref 75–99)
GLUCOSE BLD-MCNC: 192 MG/DL (ref 75–99)
GLUCOSE SERPL-MCNC: 135 MG/DL (ref 70–110)
HCT VFR BLD AUTO: 46.3 % (ref 39–53)
HGB BLD-MCNC: 14.8 GM/DL (ref 13–17.5)
LYMPHOCYTES # SPEC AUTO: 1.6 K/UL (ref 1–4.8)
LYMPHOCYTES NFR SPEC AUTO: 16 %
MCH RBC QN AUTO: 29.5 PG (ref 25–35)
MCHC RBC AUTO-ENTMCNC: 32 G/DL (ref 31–37)
MCV RBC AUTO: 92.2 FL (ref 80–100)
MONOCYTES # BLD AUTO: 0.8 K/UL (ref 0–1)
MONOCYTES NFR BLD AUTO: 8 %
NEUTROPHILS # BLD AUTO: 7.2 K/UL (ref 1.3–7.7)
NEUTROPHILS NFR BLD AUTO: 71 %
PLATELET # BLD AUTO: 217 K/UL (ref 150–450)
POTASSIUM SERPL-SCNC: 3.9 MMOL/L (ref 3.5–5.5)
SODIUM SERPL-SCNC: 134 MMOL/L (ref 135–145)
WBC # BLD AUTO: 10.2 K/UL (ref 3.8–10.6)

## 2020-10-14 RX ADMIN — PANTOPRAZOLE SODIUM SCH MG: 40 TABLET, DELAYED RELEASE ORAL at 08:24

## 2020-10-14 RX ADMIN — SOTALOL HYDROCHLORIDE SCH MG: 80 TABLET ORAL at 20:47

## 2020-10-14 RX ADMIN — MEMANTINE HYDROCHLORIDE SCH MG: 10 TABLET ORAL at 08:24

## 2020-10-14 RX ADMIN — Medication SCH UNIT: at 08:24

## 2020-10-14 RX ADMIN — LINAGLIPTIN SCH MG: 5 TABLET, FILM COATED ORAL at 08:24

## 2020-10-14 RX ADMIN — INSULIN DETEMIR SCH UNIT: 100 INJECTION, SOLUTION SUBCUTANEOUS at 08:23

## 2020-10-14 RX ADMIN — DONEPEZIL HYDROCHLORIDE SCH MG: 10 TABLET ORAL at 20:46

## 2020-10-14 RX ADMIN — INSULIN ASPART SCH UNIT: 100 INJECTION, SOLUTION INTRAVENOUS; SUBCUTANEOUS at 17:07

## 2020-10-14 RX ADMIN — FOLIC ACID SCH MG: 1 TABLET ORAL at 12:32

## 2020-10-14 RX ADMIN — ASPIRIN SCH: 325 TABLET ORAL at 08:25

## 2020-10-14 RX ADMIN — FUROSEMIDE SCH MG: 40 TABLET ORAL at 08:24

## 2020-10-14 RX ADMIN — Medication SCH MG: at 20:47

## 2020-10-14 RX ADMIN — ASPIRIN 81 MG CHEWABLE TABLET SCH MG: 81 TABLET CHEWABLE at 08:24

## 2020-10-14 RX ADMIN — SERTRALINE HYDROCHLORIDE SCH MG: 50 TABLET, FILM COATED ORAL at 20:47

## 2020-10-14 RX ADMIN — CEFAZOLIN SCH: 330 INJECTION, POWDER, FOR SOLUTION INTRAMUSCULAR; INTRAVENOUS at 21:36

## 2020-10-14 RX ADMIN — MEMANTINE HYDROCHLORIDE SCH MG: 10 TABLET ORAL at 20:47

## 2020-10-14 RX ADMIN — INSULIN ASPART SCH UNIT: 100 INJECTION, SOLUTION INTRAVENOUS; SUBCUTANEOUS at 20:46

## 2020-10-14 RX ADMIN — THERA TABS SCH EACH: TAB at 12:32

## 2020-10-14 RX ADMIN — INSULIN ASPART SCH: 100 INJECTION, SOLUTION INTRAVENOUS; SUBCUTANEOUS at 12:32

## 2020-10-14 RX ADMIN — SOTALOL HYDROCHLORIDE SCH MG: 80 TABLET ORAL at 08:24

## 2020-10-14 RX ADMIN — INSULIN ASPART SCH: 100 INJECTION, SOLUTION INTRAVENOUS; SUBCUTANEOUS at 07:03

## 2020-10-14 RX ADMIN — Medication SCH MG: at 12:32

## 2020-10-14 RX ADMIN — APIXABAN SCH MG: 2.5 TABLET, FILM COATED ORAL at 08:24

## 2020-10-14 RX ADMIN — Medication SCH UNIT: at 20:46

## 2020-10-14 RX ADMIN — APIXABAN SCH MG: 2.5 TABLET, FILM COATED ORAL at 20:46

## 2020-10-14 RX ADMIN — ATORVASTATIN CALCIUM SCH MG: 40 TABLET, FILM COATED ORAL at 20:46

## 2020-10-14 NOTE — P.PN
Subjective


Progress Note Date: 10/14/20





This is a pleasant 86-year-old  gentleman with documented history of 

hypertension, diabetes, chronic heart failure, atrial fibrillation, dementia, 

coronary artery disease with prior bypass surgery and stent placement, 

hyperlipidemia, who presented to the hospital with symptoms of dizziness, fatig

ue and weakness.  Patient also states he is unsure but he may have had a fall.  

He does answer some questions appropriately, but is somewhat confused on other 

topics.  He states that he follows with Dr. Jeffries out of town as his 

cardiologist.  CAT scan of the brain did not reveal any acute abnormality on 

presentation here, chest x-ray did not reveal any acute process EKG showed 

normal sinus rhythm with PACs.  Abdominal pelvis CT showed some bladder wall 

thickening.  Positive UTI.  Blood pressure 110/80 with a heart rate in the 70s 

93% on room air.  White blood cell count 14.9, hemoglobin 16.8, platelet count 

220 this morning 10.6 15.7 and 212.  Sodium 135, potassium 4.1, BUN 29 creatinin

e 1.0 troponins 0.01 6.01 9.020 and a BNP level of 1210.  Cardiology 

consultation was requested for congestive heart failure.  Patient is lying 

comfortably in bed this morning at the time of examination, no shortness of 

breath.








10/14/2020


Patient seen and examined this morning, no complaints, feels well overall.  He 

denies any dizziness or lightheadedness, feeling slightly tired this morning.  B

lood pressure 130/70 with a heart rate in the 60s, 94% on room air.  White blood

cell count 10.2, hemoglobin 14.8, platelet count 217.  Echocardiogram with 

Doppler study was performed which revealed an ejection fraction of 40-45%, mild 

MR and mild TR.








Objective





- Vital Signs


Vital signs: 


                                   Vital Signs











Temp  98.0 F   10/14/20 06:59


 


Pulse  66   10/14/20 06:59


 


Resp  16   10/14/20 06:59


 


BP  131/71   10/14/20 06:59


 


Pulse Ox  94 L  10/14/20 06:59








                                 Intake & Output











 10/13/20 10/14/20 10/14/20





 18:59 06:59 18:59


 


Intake Total  100 


 


Output Total 250  


 


Balance -250 100 


 


Weight  82.5 kg 


 


Intake:   


 


  Oral  100 


 


Output:   


 


  Urine 250  


 


Other:   


 


  Voiding Method Urinal Urinal 


 


  # Voids 4 1 1


 


  # Bowel Movements  1 














- Exam





PHYSICAL EXAMINATION: 





GENERAL: 86-year-old  gentleman in no acute distress at the time of my 

examination





HEENT: Head is atraumatic, normocephalic.  Pupils equal, round.  Sclera 

anicteric. Conjunctiva are clear.  Mucous membranes of the mouth are moist.  

Neck is supple.  There is no elevated jugular venous pressure.  No carotid  

bruit is heard.





HEART EXAMINATION: S1-S2 with systolic murmur is heard





CHEST EXAMINATION: Lungs are clear to auscultation and precussion. No chest wall

tenderness is noted on palpation or with deep breathing.





ABDOMEN:  Soft, nontender. Bowel sounds are heard. No organomegaly noted.


 


EXTREMITIES: 2+ peripheral pulses with no evidence of peripheral edema and no 

calf tenderness noted.





NEUROLOGIC patient is awake, alert and oriented 2 .





- Labs


CBC & Chem 7: 


                                 10/14/20 06:12





                                 10/13/20 05:31


Labs: 


                  Abnormal Lab Results - Last 24 Hours (Table)











  10/13/20 10/13/20 10/13/20 Range/Units





  05:31 11:35 17:09 


 


BUN  29.0 H    (9.0-27.0)  mg/dL


 


BUN/Creatinine Ratio  29.00 H    (12.00-20.00)  Ratio


 


Glucose  113 H    ()  mg/dL


 


POC Glucose (mg/dL)   114 H  114 H  (75-99)  mg/dL














  10/13/20 10/14/20 Range/Units





  21:16 07:00 


 


BUN    (9.0-27.0)  mg/dL


 


BUN/Creatinine Ratio    (12.00-20.00)  Ratio


 


Glucose    ()  mg/dL


 


POC Glucose (mg/dL)  191 H  127 H  (75-99)  mg/dL








                      Microbiology - Last 24 Hours (Table)











 10/13/20 00:59 Blood Culture - Preliminary





 Blood    No Growth after 24 hours


 


 10/12/20 19:18 Urine Culture - Preliminary





 Urine,Voided    Gram Neg Bacilli














Assessment and Plan


Plan: 


Assessment and plan


#1 UTI/sepsis


#2 symptoms of dizziness, fatigue and weakness, could be secondary to UTI


#3 no clear-cut evidence of congestive cardiac failure


#4 paroxysmal atrial fibrillation


#5 dementia


#6 diabetes


#7 hypertension


#8 hyperlipidemia


#9 coronary artery disease with prior bypass surgery and stenting





Plan


Echocardiogram with Doppler study was performed which revealed an ejection 

fraction of 40-45%.  Patient's current medications have been reviewed, we will 

continue with same.  We will follow this patient along with you now on an as-

needed basis only, please don't hesitate to call if you have any questions.


DNP note has been reviewed, I agree with a documented findings and plan of care.

 Patient was seen and examined.

## 2020-10-14 NOTE — PN
PROGRESS NOTE



DATE OF SERVICE:

10/14/2020



This 86-year-old gentleman who was admitted with acute UTI with sepsis is being closely

monitored.  No chest pain.  No palpitations.  No fever.



PHYSICAL EXAMINATION:

Alert and oriented x3. Pulse 54, blood pressure 119/52, respiration 20, temperature

98.7, pulse ox 94% on room air.

HEENT: Conjunctivae normal.

NECK: No jugular venous distention.

CARDIOVASCULAR SYSTEM:  S1, S2 muffled.

RESPIRATORY SYSTEM: Breath sounds diminished at the bases.  No rhonchi. No crackles.

ABDOMEN: Soft, non-tender.

LEGS: No edema. No swelling.

NERVOUS SYSTEM: No focal deficit.



LABS:

Sodium 134.  CBC noted.  The culture is showing Gram-negative bacilli. Final ID is

pending.



ASSESSMENT:

1. Acute urinary tract infection with sepsis, present on admission, with Gram-negative

    bacilli. Final ID pending.

2. Increased white count.

3. Change in mental status, acute metabolic encephalopathy secondary to urinary tract

    infection and sepsis.

4. Hyponatremia.

5. Congestive heart failure with chronic systolic dysfunction, ejection fraction 40%

    to 45%.

6. History of atrial fibrillation, paroxysmal.

7. Dementia.

8. Diabetes mellitus, type 2.

9. History of coronary artery disease, coronary artery bypass grafting, stent.

10.History of depression.

11.FULL CODE.



RECOMMENDATIONS AND DISCUSSION:

I recommend to continue current medications, continue with the monitoring, symptomatic

treatment. Will continue to monitor.  Continue the antibiotics.  Await the final ID.

Follow closely with Cardiology. Increase ambulation. Once the patient is stabilized,

the patient will be discharged in the next 24-48 hours. Further recommendations to

follow.





MMODL / IJN: [id number] / Job#: [id number]

## 2020-10-15 VITALS
HEART RATE: 61 BPM | SYSTOLIC BLOOD PRESSURE: 170 MMHG | RESPIRATION RATE: 16 BRPM | TEMPERATURE: 98.2 F | DIASTOLIC BLOOD PRESSURE: 74 MMHG

## 2020-10-15 LAB
ANION GAP SERPL CALC-SCNC: 9.4 MMOL/L (ref 4–12)
BASOPHILS # BLD AUTO: 0.1 K/UL (ref 0–0.2)
BASOPHILS NFR BLD AUTO: 1 %
BUN SERPL-SCNC: 38 MG/DL (ref 9–27)
BUN/CREAT SERPL: 38 RATIO (ref 12–20)
CALCIUM SPEC-MCNC: 10 MG/DL (ref 8.7–10.3)
CHLORIDE SERPL-SCNC: 98 MMOL/L (ref 96–109)
CO2 SERPL-SCNC: 27.6 MMOL/L (ref 21.6–31.8)
EOSINOPHIL # BLD AUTO: 0.3 K/UL (ref 0–0.7)
EOSINOPHIL NFR BLD AUTO: 4 %
ERYTHROCYTE [DISTWIDTH] IN BLOOD BY AUTOMATED COUNT: 5.04 M/UL (ref 4.3–5.9)
ERYTHROCYTE [DISTWIDTH] IN BLOOD: 14.3 % (ref 11.5–15.5)
GLUCOSE BLD-MCNC: 152 MG/DL (ref 75–99)
GLUCOSE BLD-MCNC: 167 MG/DL (ref 75–99)
GLUCOSE SERPL-MCNC: 141 MG/DL (ref 70–110)
HCT VFR BLD AUTO: 46.6 % (ref 39–53)
HGB BLD-MCNC: 14.4 GM/DL (ref 13–17.5)
LYMPHOCYTES # SPEC AUTO: 2.3 K/UL (ref 1–4.8)
LYMPHOCYTES NFR SPEC AUTO: 24 %
MCH RBC QN AUTO: 28.6 PG (ref 25–35)
MCHC RBC AUTO-ENTMCNC: 30.9 G/DL (ref 31–37)
MCV RBC AUTO: 92.5 FL (ref 80–100)
MONOCYTES # BLD AUTO: 0.7 K/UL (ref 0–1)
MONOCYTES NFR BLD AUTO: 7 %
NEUTROPHILS # BLD AUTO: 6 K/UL (ref 1.3–7.7)
NEUTROPHILS NFR BLD AUTO: 63 %
PLATELET # BLD AUTO: 230 K/UL (ref 150–450)
POTASSIUM SERPL-SCNC: 4.3 MMOL/L (ref 3.5–5.5)
SODIUM SERPL-SCNC: 135 MMOL/L (ref 135–145)
WBC # BLD AUTO: 9.5 K/UL (ref 3.8–10.6)

## 2020-10-15 RX ADMIN — THERA TABS SCH EACH: TAB at 10:20

## 2020-10-15 RX ADMIN — FUROSEMIDE SCH MG: 40 TABLET ORAL at 10:21

## 2020-10-15 RX ADMIN — ASPIRIN 81 MG CHEWABLE TABLET SCH MG: 81 TABLET CHEWABLE at 10:20

## 2020-10-15 RX ADMIN — APIXABAN SCH MG: 2.5 TABLET, FILM COATED ORAL at 10:19

## 2020-10-15 RX ADMIN — SOTALOL HYDROCHLORIDE SCH MG: 80 TABLET ORAL at 10:20

## 2020-10-15 RX ADMIN — INSULIN ASPART SCH UNIT: 100 INJECTION, SOLUTION INTRAVENOUS; SUBCUTANEOUS at 12:46

## 2020-10-15 RX ADMIN — INSULIN DETEMIR SCH UNIT: 100 INJECTION, SOLUTION SUBCUTANEOUS at 07:24

## 2020-10-15 RX ADMIN — Medication SCH UNIT: at 10:19

## 2020-10-15 RX ADMIN — MEMANTINE HYDROCHLORIDE SCH MG: 10 TABLET ORAL at 10:20

## 2020-10-15 RX ADMIN — INSULIN ASPART SCH UNIT: 100 INJECTION, SOLUTION INTRAVENOUS; SUBCUTANEOUS at 07:24

## 2020-10-15 RX ADMIN — LINAGLIPTIN SCH MG: 5 TABLET, FILM COATED ORAL at 10:21

## 2020-10-15 RX ADMIN — Medication SCH MG: at 10:20

## 2020-10-15 RX ADMIN — PANTOPRAZOLE SODIUM SCH MG: 40 TABLET, DELAYED RELEASE ORAL at 10:20

## 2020-10-15 RX ADMIN — FOLIC ACID SCH MG: 1 TABLET ORAL at 10:20

## 2020-10-16 NOTE — DS
DISCHARGE SUMMARY



DATE OF SERVICE:

10/15/2020



FINAL DIAGNOSES:

1. Acute urinary tract infection with sepsis, present on admission with Escherichia

    coli, poly sensitive.

2. Increased WBC, improved.

3. Change in mental status, acute metabolic encephalopathy secondary to urinary tract

    infection and sepsis.

4. Hyponatremia.

5. Congestive heart failure with chronic systolic dysfunction ejection fraction 40% TO

    45%.

6. History of atrial fibrillation, paroxysmal.

7. Dementia.

8. Diabetes mellitus type 2.

9. History of coronary artery disease, coronary artery bypass grafting, stent.

10.History of depression.

11.FULL CODE.



DISCHARGE DISPOSITION:

The patient will be discharged in stable condition with guarded prognosis.



Total time taken 35 minutes.



HISTORY OF PRESENT ILLNESS:

This 86-year-old gentleman with a past medical history of multiple medical problems

admitted with acute UTI with sepsis.  The patient treated with antibiotics.  E coli

grown from the culture.  Cardiology saw the patient.  Otherwise, the patient improved

significantly.



On exam, vitals are stable.

CARDIOVASCULAR: S1, S2 muffled.

ABDOMEN: Soft.

NERVOUS SYSTEM: No focal deficits.



There is no evidence of acute CHF.



DISCHARGE ADVICE AND MEDICATIONS:

1. Diet is cardiac.

2. Activity limited until followup.

3. Follow up with primary physician in 2-3 days.

4. Follow up with Cardiology as recommended.

Medications are:

1. Ammonia lactate as before.

2. Aricept 10 mg at bedtime.

3. Aspirin 81 mg p.o. daily.

4. Sotalol 80 mg p.o. b.i.d.

5. Coenzyme Q 100 mg at bedtime.

6. Eliquis 5 mg p.o. b.i.d.

7. Glucophage 500 mg p.o. daily.

8. Invokana 300 mg p.o. daily.

9. Iron sulfate 325 mg q.48h hours.

10.Januvia 100 mg p.o. daily.

11.Lantus 58 units subcu daily.

12.Lasix 40 mg p.o. daily.

13.Lipitor 40 mg at bedtime.

14.Namenda 28 mg p.o. daily.

15.Nitroglycerin 0.4 sublingual p.r.n.

16.Ketoconazole 2% topically daily.

17.Omeprazole 20 mg p.o. daily.

18.Vitamin B complex one p.o. daily.

19.Vitamin D3, 2000 b.i.d.

20.Zestril 2.5 mg daily.

21.Zoloft 50 mg at bedtime.

22.Ceftin 500 mg p.o. b.i.d.

23.Folic acid 1 mg p.o. daily.

24.Multivitamins 1 p.o. daily.

25.Vitamin B1, 100 mg p.o. daily.





MMALEX / ZAHRAAN: [id number] / Job#: [id number]

## 2021-04-10 ENCOUNTER — HOSPITAL ENCOUNTER (INPATIENT)
Dept: HOSPITAL 47 - EC | Age: 86
LOS: 3 days | Discharge: HOME | DRG: 177 | End: 2021-04-13
Attending: HOSPITALIST | Admitting: HOSPITALIST
Payer: MEDICARE

## 2021-04-10 DIAGNOSIS — I48.0: ICD-10-CM

## 2021-04-10 DIAGNOSIS — Z79.82: ICD-10-CM

## 2021-04-10 DIAGNOSIS — J44.1: ICD-10-CM

## 2021-04-10 DIAGNOSIS — Z79.01: ICD-10-CM

## 2021-04-10 DIAGNOSIS — Z95.1: ICD-10-CM

## 2021-04-10 DIAGNOSIS — I48.20: ICD-10-CM

## 2021-04-10 DIAGNOSIS — J44.0: ICD-10-CM

## 2021-04-10 DIAGNOSIS — J12.82: ICD-10-CM

## 2021-04-10 DIAGNOSIS — Z79.899: ICD-10-CM

## 2021-04-10 DIAGNOSIS — U07.1: Primary | ICD-10-CM

## 2021-04-10 DIAGNOSIS — Z79.4: ICD-10-CM

## 2021-04-10 DIAGNOSIS — F03.90: ICD-10-CM

## 2021-04-10 DIAGNOSIS — I25.10: ICD-10-CM

## 2021-04-10 DIAGNOSIS — Z20.822: ICD-10-CM

## 2021-04-10 DIAGNOSIS — F32.9: ICD-10-CM

## 2021-04-10 DIAGNOSIS — J96.01: ICD-10-CM

## 2021-04-10 DIAGNOSIS — I50.22: ICD-10-CM

## 2021-04-10 DIAGNOSIS — T38.0X5A: ICD-10-CM

## 2021-04-10 DIAGNOSIS — E83.42: ICD-10-CM

## 2021-04-10 DIAGNOSIS — I11.0: ICD-10-CM

## 2021-04-10 DIAGNOSIS — E11.65: ICD-10-CM

## 2021-04-10 DIAGNOSIS — E78.5: ICD-10-CM
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
## 2021-04-10 DIAGNOSIS — Z66: ICD-10-CM

## 2021-04-10 LAB
ALBUMIN SERPL-MCNC: 3.6 G/DL (ref 3.5–5)
ALP SERPL-CCNC: 95 U/L (ref 38–126)
ALT SERPL-CCNC: 16 U/L (ref 4–49)
ANION GAP SERPL CALC-SCNC: 10 MMOL/L
APTT BLD: 23.8 SEC (ref 22–30)
AST SERPL-CCNC: 17 U/L (ref 17–59)
BASOPHILS # BLD AUTO: 0.1 K/UL (ref 0–0.2)
BASOPHILS NFR BLD AUTO: 1 %
BUN SERPL-SCNC: 12 MG/DL (ref 9–20)
CALCIUM SPEC-MCNC: 10.1 MG/DL (ref 8.4–10.2)
CHLORIDE SERPL-SCNC: 106 MMOL/L (ref 98–107)
CO2 SERPL-SCNC: 20 MMOL/L (ref 22–30)
D DIMER PPP FEU-MCNC: 0.79 MG/L FEU (ref ?–0.6)
EOSINOPHIL # BLD AUTO: 0.4 K/UL (ref 0–0.7)
EOSINOPHIL NFR BLD AUTO: 2 %
ERYTHROCYTE [DISTWIDTH] IN BLOOD BY AUTOMATED COUNT: 5.69 M/UL (ref 4.3–5.9)
ERYTHROCYTE [DISTWIDTH] IN BLOOD: 14.2 % (ref 11.5–15.5)
GLUCOSE BLD-MCNC: 197 MG/DL (ref 75–99)
GLUCOSE SERPL-MCNC: 219 MG/DL (ref 74–99)
HCO3 BLDV-SCNC: 21 MMOL/L (ref 24–28)
HCT VFR BLD AUTO: 51.3 % (ref 39–53)
HGB BLD-MCNC: 16.7 GM/DL (ref 13–17.5)
INR PPP: 1 (ref ?–1.2)
LDH SPEC-CCNC: 610 U/L (ref 313–618)
LYMPHOCYTES # SPEC AUTO: 2.1 K/UL (ref 1–4.8)
LYMPHOCYTES NFR SPEC AUTO: 11 %
MAGNESIUM SPEC-SCNC: 1.5 MG/DL (ref 1.6–2.3)
MCH RBC QN AUTO: 29.4 PG (ref 25–35)
MCHC RBC AUTO-ENTMCNC: 32.6 G/DL (ref 31–37)
MCV RBC AUTO: 90.2 FL (ref 80–100)
MONOCYTES # BLD AUTO: 0.8 K/UL (ref 0–1)
MONOCYTES NFR BLD AUTO: 4 %
NEUTROPHILS # BLD AUTO: 15.9 K/UL (ref 1.3–7.7)
NEUTROPHILS NFR BLD AUTO: 81 %
PCO2 BLDV: 38 MMHG (ref 37–51)
PH BLDV: 7.37 [PH] (ref 7.31–7.41)
PLATELET # BLD AUTO: 254 K/UL (ref 150–450)
POTASSIUM SERPL-SCNC: 4.9 MMOL/L (ref 3.5–5.1)
PROT SERPL-MCNC: 6.6 G/DL (ref 6.3–8.2)
PT BLD: 11.1 SEC (ref 9–12)
SODIUM SERPL-SCNC: 136 MMOL/L (ref 137–145)
WBC # BLD AUTO: 19.5 K/UL (ref 3.8–10.6)

## 2021-04-10 PROCEDURE — 80053 COMPREHEN METABOLIC PANEL: CPT

## 2021-04-10 PROCEDURE — 94760 N-INVAS EAR/PLS OXIMETRY 1: CPT

## 2021-04-10 PROCEDURE — 83880 ASSAY OF NATRIURETIC PEPTIDE: CPT

## 2021-04-10 PROCEDURE — 93005 ELECTROCARDIOGRAM TRACING: CPT

## 2021-04-10 PROCEDURE — 72100 X-RAY EXAM L-S SPINE 2/3 VWS: CPT

## 2021-04-10 PROCEDURE — 85025 COMPLETE CBC W/AUTO DIFF WBC: CPT

## 2021-04-10 PROCEDURE — 86140 C-REACTIVE PROTEIN: CPT

## 2021-04-10 PROCEDURE — 87040 BLOOD CULTURE FOR BACTERIA: CPT

## 2021-04-10 PROCEDURE — 83615 LACTATE (LD) (LDH) ENZYME: CPT

## 2021-04-10 PROCEDURE — 83605 ASSAY OF LACTIC ACID: CPT

## 2021-04-10 PROCEDURE — 36415 COLL VENOUS BLD VENIPUNCTURE: CPT

## 2021-04-10 PROCEDURE — 80048 BASIC METABOLIC PNL TOTAL CA: CPT

## 2021-04-10 PROCEDURE — 84484 ASSAY OF TROPONIN QUANT: CPT

## 2021-04-10 PROCEDURE — 96365 THER/PROPH/DIAG IV INF INIT: CPT

## 2021-04-10 PROCEDURE — 94660 CPAP INITIATION&MGMT: CPT

## 2021-04-10 PROCEDURE — 82803 BLOOD GASES ANY COMBINATION: CPT

## 2021-04-10 PROCEDURE — 85379 FIBRIN DEGRADATION QUANT: CPT

## 2021-04-10 PROCEDURE — 71275 CT ANGIOGRAPHY CHEST: CPT

## 2021-04-10 PROCEDURE — 83735 ASSAY OF MAGNESIUM: CPT

## 2021-04-10 PROCEDURE — 94640 AIRWAY INHALATION TREATMENT: CPT

## 2021-04-10 PROCEDURE — 71045 X-RAY EXAM CHEST 1 VIEW: CPT

## 2021-04-10 PROCEDURE — 82728 ASSAY OF FERRITIN: CPT

## 2021-04-10 PROCEDURE — 83036 HEMOGLOBIN GLYCOSYLATED A1C: CPT

## 2021-04-10 PROCEDURE — 85610 PROTHROMBIN TIME: CPT

## 2021-04-10 PROCEDURE — 85730 THROMBOPLASTIN TIME PARTIAL: CPT

## 2021-04-10 PROCEDURE — 87635 SARS-COV-2 COVID-19 AMP PRB: CPT

## 2021-04-10 PROCEDURE — 84145 PROCALCITONIN (PCT): CPT

## 2021-04-10 PROCEDURE — 99291 CRITICAL CARE FIRST HOUR: CPT

## 2021-04-10 NOTE — ED
General Adult HPI





- General


Chief complaint: Shortness of Breath


Stated complaint: SENTHIL


Time Seen by Provider: 04/10/21 18:51


Source: patient, EMS


Mode of arrival: EMS


Limitations: no limitations





- History of Present Illness


Initial comments: 





Patient presents to the ED by ambulance for evaluation.  Per EMS, the patient 

was in significant respiratory distress when they arrived on scene and he was 

hypoxic, so he was placed on CPAP ventilation.  Per EMS, the patient's breathing

seems to have improved now.  Patient states that his dyspnea has improved with 

CPAP administration.  Patient admits to having a cough as well.  Patient denies 

having any other symptoms or complaints.  Patient denies having any pain, fever 

or chills, headache, focal neuro deficit, chest pain, palpitations, dizziness, 

nausea/vomiting/diarrhea, abdominal pain, bloody or melanotic stool, dysuria or 

urinary symptoms, decreased urine output, leg or calf swelling or pain, or any 

other symptoms or complaints.  Per EMS, one of the patient's family members 

recently tested positive for Covid.  He shouldn't was given a DuoNeb treatment, 

an albuterol neb treatment and IV Solu-Medrol by EMS.





- Related Data


                                Home Medications











 Medication  Instructions  Recorded  Confirmed


 


Acetaminophen/Diphenhydramine 1 tab PO HS 10/12/20 04/10/21





[Tylenol -25mg]   


 


Ammonium Lactate 12%/Aquaphor  1 applic TOPICAL DAILY PRN 10/12/20 04/10/21


 


Apixaban [Eliquis] 5 mg PO BID 10/12/20 04/10/21


 


Aspirin 81 mg PO DAILY 10/12/20 04/10/21


 


Atorvastatin [Lipitor] 40 mg PO HS 10/12/20 04/10/21


 


Canagliflozin [Invokana] 300 mg PO DAILY 10/12/20 04/10/21


 


Cholecalciferol [Vitamin D3 (25 50 mcg PO BID 10/12/20 04/10/21





Mcg = 1000 Iu)]   


 


Donepezil [Aricept] 10 mg PO HS 10/12/20 04/10/21


 


Ferrous Sulfate [Iron (65  mg PO Q48H 10/12/20 04/10/21





Elemental)]   


 


Furosemide [Lasix] 40 mg PO DAILY 10/12/20 04/10/21


 


Insulin Glargine,Hum.rec.anlog 58 unit SQ DAILY 10/12/20 04/10/21





[Lantus Solostar]   


 


Ketoconazole 2% Cream [Nizoral 2%] 1 applic TOPICAL DAILY PRN 10/12/20 04/10/21


 


Memantine HCl [Memantine HCl ER] 28 mg PO DAILY 10/12/20 04/10/21


 


Nitroglycerin Sl Tabs [Nitrostat] 0.4 mg SL Q5M PRN 10/12/20 04/10/21


 


Nitroglycerin [Nitroglycerin 1 spray TRANSLINGU Q5M PRN 10/12/20 04/10/21





400MCG Spray]   


 


Omeprazole Magnesium [PriLOSEC OTC] 20 mg PO DAILY 10/12/20 04/10/21


 


Sertraline HCl [Zoloft] 50 mg PO HS 10/12/20 04/10/21


 


Sotalol [Betapace] 80 mg PO BID 10/12/20 04/10/21


 


Ubidecarenone [Co Q-10] 100 mg PO HS 10/12/20 04/10/21


 


Vitamin B Complex 1 cap PO DAILY 10/12/20 04/10/21


 


lisinopriL [Zestril] 2.5 mg PO DAILY 10/12/20 04/10/21


 


metFORMIN HCL [Glucophage] 500 mg PO DAILY 10/12/20 04/10/21


 


sitaGLIPtin [Januvia] 100 mg PO DAILY 10/12/20 04/10/21


 


Cetirizine HCl [Zyrtec] 10 mg PO HS 04/10/21 04/10/21


 


Clotrimazole/Betamethasone Dip 1 applic TOPICAL DAILY 04/10/21 04/10/21





[Lotrisone Cream]   











                                    Allergies











Allergy/AdvReac Type Severity Reaction Status Date / Time


 


Penicillins Allergy  Unknown Verified 04/10/21 20:32





   Childhood  














Review of Systems


ROS Statement: 


Those systems with pertinent positive or pertinent negative responses have been 

documented in the HPI.





ROS Other: All systems not noted in ROS Statement are negative.





Past Medical History


Past Medical History: Atrial Fibrillation, Heart Failure, Dementia, Diabetes 

Mellitus


Additional Past Medical History / Comment(s): CHF


History of Any Multi-Drug Resistant Organisms: None Reported


Past Surgical History: Coronary Bypass/CABG


Additional Past Surgical History / Comment(s): Stents


Past Anesthesia/Blood Transfusion Reactions: No Reported Reaction


Past Psychological History: Depression


Smoking Status: Never smoker


Past Alcohol Use History: None Reported


Past Drug Use History: None Reported





- Past Family History


  ** Father


History Unknown: Yes





General Exam


Limitations: no limitations


General appearance: alert


Head exam: Present: atraumatic, normocephalic


Eye exam: Present: normal appearance, EOMI


ENT exam: Present: mucous membranes moist


Neck exam: Present: other (Trachea is in midline)


Respiratory exam: Present: respiratory distress, wheezes, rales, prolonged 

expiratory, other (Equal breath sounds bilaterally).  Absent: stridor


Cardiovascular Exam: Present: regular rate, normal rhythm, normal heart sounds, 

other (Normal radial pulses bilaterally)


GI/Abdominal exam: Present: soft.  Absent: distended, tenderness, guarding


Extremities exam: Present: other (Negative Homans sign bilaterally).  Absent: 

tenderness, pedal edema, calf tenderness


Neurological exam: Present: alert, oriented X3.  Absent: motor sensory deficit


Psychiatric exam: Present: normal affect, normal mood


Skin exam: Present: warm, dry, intact, normal color





Course


                                   Vital Signs











  04/10/21 04/10/21 04/10/21





  18:50 19:02 19:07


 


Pulse Rate 80 71 78


 


Respiratory 24 24 





Rate   


 


Blood Pressure 146/76 142/69 


 


O2 Sat by Pulse 87 L 93 L 





Oximetry   














  04/10/21 04/10/21





  19:28 20:00


 


Pulse Rate 84 71


 


Respiratory  20





Rate  


 


Blood Pressure  122/55


 


O2 Sat by Pulse  96





Oximetry  














- Reevaluation(s)


Reevaluation #1: 





04/10/21 22:36


Case, H&P, test results and ED management were discussed with Dr. Freedman.  He 

accepts hospital admission.  He agrees with pulmonology consultation.  He has no

further recommendations at this time.


04/10/21 22:47


Patient is now breathing much more comfortably.  Patient states that his dyspnea

has now improved.  Patient denies development of any new symptoms while in the 

ED.  Patient is aware of his test results, and he agrees with hospital admission

at this time.





EKG Findings





- EKG Comments:


EKG Findings:: Normal sinus rhythm, ventricular rate of 74 bpm, no ectopy, 

normal KS and QRS intervals, normal QT interval, leftward axis, nonspecific 

intraventricular conduction delay, no significant change when compared to 

10/12/2020 EKG





Medical Decision Making





- Medical Decision Making





I suspect that the patient's dyspnea and hypoxia are likely secondary to COPD 

exacerbation and pneumonia.  Patient's Covid test is negative, however this may 

be a false negative test given the appearance of his infiltrates on computed 

tomography scan, as well as the fact that he has a family member who recently 

tested positive.  Will repeat Covid testing to confirm that is negative.  

Patient has been treated with IV steroids, neb treatments, as well as IV 

antibiotics.  Dr. Freedman has accepted hospital admission.





- Lab Data


Result diagrams: 


                                 04/10/21 18:57





                                 04/10/21 18:57


                                   Lab Results











  04/10/21 04/10/21 04/10/21 Range/Units





  18:50 18:57 18:57 


 


WBC   19.5 H   (3.8-10.6)  k/uL


 


RBC   5.69   (4.30-5.90)  m/uL


 


Hgb   16.7   (13.0-17.5)  gm/dL


 


Hct   51.3   (39.0-53.0)  %


 


MCV   90.2   (80.0-100.0)  fL


 


MCH   29.4   (25.0-35.0)  pg


 


MCHC   32.6   (31.0-37.0)  g/dL


 


RDW   14.2   (11.5-15.5)  %


 


Plt Count   254   (150-450)  k/uL


 


MPV   7.1   


 


Neutrophils %   81   %


 


Lymphocytes %   11   %


 


Monocytes %   4   %


 


Eosinophils %   2   %


 


Basophils %   1   %


 


Neutrophils #   15.9 H   (1.3-7.7)  k/uL


 


Lymphocytes #   2.1   (1.0-4.8)  k/uL


 


Monocytes #   0.8   (0-1.0)  k/uL


 


Eosinophils #   0.4   (0-0.7)  k/uL


 


Basophils #   0.1   (0-0.2)  k/uL


 


PT    11.1  (9.0-12.0)  sec


 


INR    1.0  (<1.2)  


 


APTT    23.8  (22.0-30.0)  sec


 


D-Dimer    0.79 H  (<0.60)  mg/L FEU


 


VBG pH     (7.31-7.41)  


 


VBG pCO2     (37-51)  mmHg


 


VBG HCO3     (24-28)  mmol/L


 


Sodium     (137-145)  mmol/L


 


Potassium     (3.5-5.1)  mmol/L


 


Chloride     ()  mmol/L


 


Carbon Dioxide     (22-30)  mmol/L


 


Anion Gap     mmol/L


 


BUN     (9-20)  mg/dL


 


Creatinine     (0.66-1.25)  mg/dL


 


Est GFR (CKD-EPI)AfAm     (>60 ml/min/1.73 sqM)  


 


Est GFR (CKD-EPI)NonAf     (>60 ml/min/1.73 sqM)  


 


Glucose     (74-99)  mg/dL


 


POC Glucose (mg/dL)  197 H    (75-99)  mg/dL


 


POC Glu Operater ID  Wiseheart, Codie    


 


Plasma Lactic Acid Nando     (0.7-2.0)  mmol/L


 


Calcium     (8.4-10.2)  mg/dL


 


Magnesium     (1.6-2.3)  mg/dL


 


Total Bilirubin     (0.2-1.3)  mg/dL


 


AST     (17-59)  U/L


 


ALT     (4-49)  U/L


 


Alkaline Phosphatase     ()  U/L


 


Lactate Dehydrogenase     (313-618)  U/L


 


Troponin I     (0.000-0.034)  ng/mL


 


C-Reactive Protein     (<10.0)  mg/L


 


NT-Pro-B Natriuret Pep     pg/mL


 


Total Protein     (6.3-8.2)  g/dL


 


Albumin     (3.5-5.0)  g/dL


 


Coronavirus (PCR)     (Not Detectd)  














  04/10/21 04/10/21 04/10/21 Range/Units





  18:57 18:57 18:57 


 


WBC     (3.8-10.6)  k/uL


 


RBC     (4.30-5.90)  m/uL


 


Hgb     (13.0-17.5)  gm/dL


 


Hct     (39.0-53.0)  %


 


MCV     (80.0-100.0)  fL


 


MCH     (25.0-35.0)  pg


 


MCHC     (31.0-37.0)  g/dL


 


RDW     (11.5-15.5)  %


 


Plt Count     (150-450)  k/uL


 


MPV     


 


Neutrophils %     %


 


Lymphocytes %     %


 


Monocytes %     %


 


Eosinophils %     %


 


Basophils %     %


 


Neutrophils #     (1.3-7.7)  k/uL


 


Lymphocytes #     (1.0-4.8)  k/uL


 


Monocytes #     (0-1.0)  k/uL


 


Eosinophils #     (0-0.7)  k/uL


 


Basophils #     (0-0.2)  k/uL


 


PT     (9.0-12.0)  sec


 


INR     (<1.2)  


 


APTT     (22.0-30.0)  sec


 


D-Dimer     (<0.60)  mg/L FEU


 


VBG pH     (7.31-7.41)  


 


VBG pCO2     (37-51)  mmHg


 


VBG HCO3     (24-28)  mmol/L


 


Sodium  136 L    (137-145)  mmol/L


 


Potassium  4.9    (3.5-5.1)  mmol/L


 


Chloride  106    ()  mmol/L


 


Carbon Dioxide  20 L    (22-30)  mmol/L


 


Anion Gap  10    mmol/L


 


BUN  12    (9-20)  mg/dL


 


Creatinine  0.73    (0.66-1.25)  mg/dL


 


Est GFR (CKD-EPI)AfAm  >90    (>60 ml/min/1.73 sqM)  


 


Est GFR (CKD-EPI)NonAf  84    (>60 ml/min/1.73 sqM)  


 


Glucose  219 H    (74-99)  mg/dL


 


POC Glucose (mg/dL)     (75-99)  mg/dL


 


POC Glu Operater ID     


 


Plasma Lactic Acid Nando   1.7   (0.7-2.0)  mmol/L


 


Calcium  10.1    (8.4-10.2)  mg/dL


 


Magnesium  1.5 L    (1.6-2.3)  mg/dL


 


Total Bilirubin  0.8    (0.2-1.3)  mg/dL


 


AST  17    (17-59)  U/L


 


ALT  16    (4-49)  U/L


 


Alkaline Phosphatase  95    ()  U/L


 


Lactate Dehydrogenase  610    (313-618)  U/L


 


Troponin I    <0.012  (0.000-0.034)  ng/mL


 


C-Reactive Protein  19.5 H    (<10.0)  mg/L


 


NT-Pro-B Natriuret Pep     pg/mL


 


Total Protein  6.6    (6.3-8.2)  g/dL


 


Albumin  3.6    (3.5-5.0)  g/dL


 


Coronavirus (PCR)     (Not Detectd)  














  04/10/21 04/10/21 04/10/21 Range/Units





  18:57 18:57 18:57 


 


WBC     (3.8-10.6)  k/uL


 


RBC     (4.30-5.90)  m/uL


 


Hgb     (13.0-17.5)  gm/dL


 


Hct     (39.0-53.0)  %


 


MCV     (80.0-100.0)  fL


 


MCH     (25.0-35.0)  pg


 


MCHC     (31.0-37.0)  g/dL


 


RDW     (11.5-15.5)  %


 


Plt Count     (150-450)  k/uL


 


MPV     


 


Neutrophils %     %


 


Lymphocytes %     %


 


Monocytes %     %


 


Eosinophils %     %


 


Basophils %     %


 


Neutrophils #     (1.3-7.7)  k/uL


 


Lymphocytes #     (1.0-4.8)  k/uL


 


Monocytes #     (0-1.0)  k/uL


 


Eosinophils #     (0-0.7)  k/uL


 


Basophils #     (0-0.2)  k/uL


 


PT     (9.0-12.0)  sec


 


INR     (<1.2)  


 


APTT     (22.0-30.0)  sec


 


D-Dimer     (<0.60)  mg/L FEU


 


VBG pH   7.37   (7.31-7.41)  


 


VBG pCO2   38   (37-51)  mmHg


 


VBG HCO3   21 L   (24-28)  mmol/L


 


Sodium     (137-145)  mmol/L


 


Potassium     (3.5-5.1)  mmol/L


 


Chloride     ()  mmol/L


 


Carbon Dioxide     (22-30)  mmol/L


 


Anion Gap     mmol/L


 


BUN     (9-20)  mg/dL


 


Creatinine     (0.66-1.25)  mg/dL


 


Est GFR (CKD-EPI)AfAm     (>60 ml/min/1.73 sqM)  


 


Est GFR (CKD-EPI)NonAf     (>60 ml/min/1.73 sqM)  


 


Glucose     (74-99)  mg/dL


 


POC Glucose (mg/dL)     (75-99)  mg/dL


 


POC Glu Operater ID     


 


Plasma Lactic Acid Nando     (0.7-2.0)  mmol/L


 


Calcium     (8.4-10.2)  mg/dL


 


Magnesium     (1.6-2.3)  mg/dL


 


Total Bilirubin     (0.2-1.3)  mg/dL


 


AST     (17-59)  U/L


 


ALT     (4-49)  U/L


 


Alkaline Phosphatase     ()  U/L


 


Lactate Dehydrogenase     (313-618)  U/L


 


Troponin I     (0.000-0.034)  ng/mL


 


C-Reactive Protein     (<10.0)  mg/L


 


NT-Pro-B Natriuret Pep  2450    pg/mL


 


Total Protein     (6.3-8.2)  g/dL


 


Albumin     (3.5-5.0)  g/dL


 


Coronavirus (PCR)    Not Detected  (Not Detectd)  














- Radiology Data


Radiology results: report reviewed (Chest x-ray: Bilateral infiltrates)





Critical Care Time


Critical Care Time: Yes


Total Critical Care Time: 70 (Hypoxia, pneumonia, COPD)





Disposition


Clinical Impression: 


 Hypoxia, Pneumonia, Dyspnea, COPD exacerbation





Disposition: ADMITTED AS IP TO THIS HOSP


Condition: Stable


Is patient prescribed a controlled substance at d/c from ED?: No


Referrals: 


None,Stated [REFERRING] - 1-2 days


Time of Disposition: 22:40

## 2021-04-10 NOTE — CT
EXAMINATION TYPE: CT chest angio for PE

 

DATE OF EXAM: 4/10/2021

 

COMPARISON: None available.

 

HISTORY: Dyspnea.

 

CT DLP: 517.4 mGycm

Automated exposure control for dose reduction was used.

 

CONTRAST: 

CT Chest for pulmonary embolism performed with with IV Contrast, patient injected with 100 mL of Isov
ue 370.

 

FINDINGS:

 

LUNGS: There is diffuse moderate patchy groundglass opacities in bilateral lower lobes and mild in th
e remainder of the lungs. There is small partially loculated right pleural effusion. There is also tr
ace left pleural effusion. No pneumothorax.

 

MEDIASTINUM: There is satisfactory enhancement of the pulmonary artery and its branches, there is no 
CT evidence for pulmonary embolism. There are scattered borderline to mildly enlarged mediastinal lym
ph nodes, may be reactive.   No pericardial effusion is seen.  There is moderate to advanced thoracic
 aorta and coronary atherosclerotic disease.

 

OTHER:  No additional significant abnormality is seen.

 

IMPRESSION:

Bilateral diffuse patchy groundglass opacities, predominantly in the lower lobes and concerning for C
ovid pneumonia.

 

No acute PE.

 

Trace to small pleural effusions, partially loculated on the right.

## 2021-04-10 NOTE — P.HPIM
History of Present Illness


H&P Date: 04/10/21





Patient is an 86-year-old male with a PMH of A. fib on Eliquis, type II DM, mild

dementia, and systolic CHF (EF 40-45% on last echo in 10/2020) who was brought 

into the emergency room by EMS with complaints of shortness of breath.  The 

patient notes that over the past few days he has had gradually worsening 

shortness of breath.  The patient reported that he receive both doses of his 

COVID vaccine over a month ago. He denied any additional complaints.  He denied 

cough, chest pain, nausea, vomiting, diaphoresis, leg pain, or leg swelling.  At

time of interview, he reported that his shortness of breath is significantly 

improved as compared to when he initially presented.  The patient notes that a 

relative of his did recently test positive for COVID.  In the emergency room, 

chest CTA revealed diffuse bilateral patchy pneumonia with EKG showing a normal 

sinus rhythm with left axis deviation with a borderline prolonged QRS.  

Laboratory evaluation was remarkable for leukocytosis at 19.5, d-dimer 0.79, 

magnesium 1.5, CRP 19.5, and coronavirus PCR initially negative and subsequently

positive. 





Review of Systems





Pertinent positives and negatives as discussed in HPI, a complete review of 

systems was performed and all other systems are negative.





Past Medical History


Past Medical History: Atrial Fibrillation, Heart Failure, Dementia, Diabetes 

Mellitus


Additional Past Medical History / Comment(s): CHF


History of Any Multi-Drug Resistant Organisms: None Reported


Past Surgical History: Coronary Bypass/CABG


Additional Past Surgical History / Comment(s): Stents


Past Anesthesia/Blood Transfusion Reactions: No Reported Reaction


Past Psychological History: Depression


Smoking Status: Never smoker


Past Alcohol Use History: None Reported


Past Drug Use History: None Reported





- Past Family History


  ** Father


History Unknown: Yes





Medications and Allergies


                                Home Medications











 Medication  Instructions  Recorded  Confirmed  Type


 


Acetaminophen/Diphenhydramine 1 tab PO HS 10/12/20 04/10/21 History





[Tylenol -25mg]    


 


Ammonium Lactate 12%/Aquaphor  1 applic TOPICAL DAILY PRN 10/12/20 04/10/21 

History


 


Apixaban [Eliquis] 5 mg PO BID 10/12/20 04/10/21 History


 


Aspirin 81 mg PO DAILY 10/12/20 04/10/21 History


 


Atorvastatin [Lipitor] 40 mg PO HS 10/12/20 04/10/21 History


 


Canagliflozin [Invokana] 300 mg PO DAILY 10/12/20 04/10/21 History


 


Cholecalciferol [Vitamin D3 (25 50 mcg PO BID 10/12/20 04/10/21 History





Mcg = 1000 Iu)]    


 


Donepezil [Aricept] 10 mg PO HS 10/12/20 04/10/21 History


 


Ferrous Sulfate [Iron (65  mg PO Q48H 10/12/20 04/10/21 History





Elemental)]    


 


Furosemide [Lasix] 40 mg PO DAILY 10/12/20 04/10/21 History


 


Insulin Glargine,Hum.rec.anlog 58 unit SQ DAILY 10/12/20 04/10/21 History





[Lantus Solostar]    


 


Ketoconazole 2% Cream [Nizoral 2%] 1 applic TOPICAL DAILY PRN 10/12/20 04/10/21 

History


 


Memantine HCl [Memantine HCl ER] 28 mg PO DAILY 10/12/20 04/10/21 History


 


Nitroglycerin Sl Tabs [Nitrostat] 0.4 mg SL Q5M PRN 10/12/20 04/10/21 History


 


Nitroglycerin [Nitroglycerin 1 spray TRANSLINGU Q5M PRN 10/12/20 04/10/21 

History





400MCG Spray]    


 


Omeprazole Magnesium [PriLOSEC OTC] 20 mg PO DAILY 10/12/20 04/10/21 History


 


Sertraline HCl [Zoloft] 50 mg PO HS 10/12/20 04/10/21 History


 


Sotalol [Betapace] 80 mg PO BID 10/12/20 04/10/21 History


 


Ubidecarenone [Co Q-10] 100 mg PO HS 10/12/20 04/10/21 History


 


Vitamin B Complex 1 cap PO DAILY 10/12/20 04/10/21 History


 


lisinopriL [Zestril] 2.5 mg PO DAILY 10/12/20 04/10/21 History


 


metFORMIN HCL [Glucophage] 500 mg PO DAILY 10/12/20 04/10/21 History


 


sitaGLIPtin [Januvia] 100 mg PO DAILY 10/12/20 04/10/21 History


 


Cetirizine HCl [Zyrtec] 10 mg PO HS 04/10/21 04/10/21 History


 


Clotrimazole/Betamethasone Dip 1 applic TOPICAL DAILY 04/10/21 04/10/21 History





[Lotrisone Cream]    








                                    Allergies











Allergy/AdvReac Type Severity Reaction Status Date / Time


 


Penicillins Allergy  Unknown Verified 04/10/21 20:32





   Childhood  














Physical Exam


Vitals: 


                                   Vital Signs











  Pulse Resp BP Pulse Ox


 


 04/10/21 20:00  71  20  122/55  96


 


 04/10/21 19:28  84   


 


 04/10/21 19:07  78   


 


 04/10/21 19:02  71  24  142/69  93 L


 


 04/10/21 18:50  80  24  146/76  87 L








                                Intake and Output











 04/10/21 04/10/21 04/11/21





 14:59 22:59 06:59


 


Other:   


 


  Weight  90.718 kg 














General: non toxic, no distress, appears at stated age, obese


Derm: no unusual rashes/lesions no unusual ecchymoses, warm, dry


Head: atraumatic, normocephalic, symmetric


Eyes: EOMI, no lid lag, anicteric sclera, pupils equal round reactive to light


ENT: Nose and ears atraumatic, no thrush,  no pharyngeal erythema


Neck: No thyromegaly, no cervical lymphadenopathy, trachea midline, supple


Mouth: no lip lesion, mucus membranes moist


Cardiovascular: S1S2 reg, no murmur, positive posterior tibial pulse bilateral, 

no edema, capillary refill less than 2 seconds


Lungs: Bilateral scattered rales and ronchi, no wheezing appreciated, no 

accessory muscle use


Abdominal: soft,  nontender to palpation, no guarding, no appreciable 

organomegaly, normal bowel sounds


Ext: no gross muscle atrophy,  muscle strength 5 out of 5 in all 4 extremities 

grossly, no contractures, 


Neuro:  CN II-XI grossly intact, light touch intact all 4 extremities, finger to

nose within normal limits,


Psych: Alert, oriented to person and year, knows he is in the hospital but 

couldn't give the name, not oriented to month, appropriate affect





Results


CBC & Chem 7: 


                                 04/10/21 18:57





                                 04/10/21 18:57


Labs: 


                  Abnormal Lab Results - Last 24 Hours (Table)











  04/10/21 04/10/21 04/10/21 Range/Units





  18:50 18:57 18:57 


 


WBC   19.5 H   (3.8-10.6)  k/uL


 


Neutrophils #   15.9 H   (1.3-7.7)  k/uL


 


D-Dimer    0.79 H  (<0.60)  mg/L FEU


 


VBG HCO3     (24-28)  mmol/L


 


Sodium     (137-145)  mmol/L


 


Carbon Dioxide     (22-30)  mmol/L


 


Glucose     (74-99)  mg/dL


 


POC Glucose (mg/dL)  197 H    (75-99)  mg/dL


 


Magnesium     (1.6-2.3)  mg/dL


 


C-Reactive Protein     (<10.0)  mg/L


 


Coronavirus (PCR)     (Not Detectd)  














  04/10/21 04/10/21 04/10/21 Range/Units





  18:57 18:57 22:44 


 


WBC     (3.8-10.6)  k/uL


 


Neutrophils #     (1.3-7.7)  k/uL


 


D-Dimer     (<0.60)  mg/L FEU


 


VBG HCO3   21 L   (24-28)  mmol/L


 


Sodium  136 L    (137-145)  mmol/L


 


Carbon Dioxide  20 L    (22-30)  mmol/L


 


Glucose  219 H    (74-99)  mg/dL


 


POC Glucose (mg/dL)     (75-99)  mg/dL


 


Magnesium  1.5 L    (1.6-2.3)  mg/dL


 


C-Reactive Protein  19.5 H    (<10.0)  mg/L


 


Coronavirus (PCR)    Detected A  (Not Detectd)  














Assessment and Plan


Plan: 


Acute hypoxic respiratory failure, atypical pneumonia versus COVID


-F/u procalcitonin levels


-Continue with azithromycin


-Patient received Solu-medrol by EMS. Hold off on Decadron for now pending 

procalcitonin


-Supplemental oxygen


-Pulmonary consult





Hypomagnasemia


-Replace and monitor





Type 2 DM


-Check A1C


-SUKH with FS


-C/w levemir reduced home dose 





Chronic conditions: Afib, mild dementia, HTN


-C/w home meds





DVT prophylaxis


-Eliquis





The patient is admitted with an anticipated greater than 2 midnight stay for 

evaluation of respiratory failure


CODE STATUS: No Code


Discussed with: Patient, Daughter


Anticipated discharge date: 3-4 days


Anticipated discharge place: Home


A total of 35 minutes was spent on the care of this complex patient more than 

50% of the time was spent in counseling and care coordination.

## 2021-04-10 NOTE — XR
EXAMINATION TYPE: XR chest 1V portable

 

DATE OF EXAM: 4/10/2021

 

COMPARISON: 10/12/2020.

 

HISTORY: Difficulty in breathing.

 

TECHNIQUE: Single frontal view of the chest is obtained.

 

FINDINGS:  There is moderate perihilar and bibasilar patchy opacities. No pleural effusion, or pneumo
thorax seen.  The cardiac silhouette size is enlarged. CABG noted.   The osseous structures are intac
t.

 

IMPRESSION:  Bilateral infiltrates.

## 2021-04-11 LAB
ANION GAP SERPL CALC-SCNC: 6 MMOL/L
BASOPHILS # BLD AUTO: 0 K/UL (ref 0–0.2)
BASOPHILS NFR BLD AUTO: 0 %
BUN SERPL-SCNC: 12 MG/DL (ref 9–20)
CALCIUM SPEC-MCNC: 10.1 MG/DL (ref 8.4–10.2)
CHLORIDE SERPL-SCNC: 104 MMOL/L (ref 98–107)
CO2 SERPL-SCNC: 27 MMOL/L (ref 22–30)
EOSINOPHIL # BLD AUTO: 0 K/UL (ref 0–0.7)
EOSINOPHIL NFR BLD AUTO: 0 %
ERYTHROCYTE [DISTWIDTH] IN BLOOD BY AUTOMATED COUNT: 5.08 M/UL (ref 4.3–5.9)
ERYTHROCYTE [DISTWIDTH] IN BLOOD: 14.6 % (ref 11.5–15.5)
FERRITIN SERPL-MCNC: 70.2 NG/ML (ref 22–322)
GLUCOSE BLD-MCNC: 140 MG/DL (ref 75–99)
GLUCOSE BLD-MCNC: 188 MG/DL (ref 75–99)
GLUCOSE BLD-MCNC: 240 MG/DL (ref 75–99)
GLUCOSE BLD-MCNC: 262 MG/DL (ref 75–99)
GLUCOSE SERPL-MCNC: 176 MG/DL (ref 74–99)
HBA1C MFR BLD: 7.4 % (ref 4–6)
HCT VFR BLD AUTO: 46.6 % (ref 39–53)
HGB BLD-MCNC: 14.4 GM/DL (ref 13–17.5)
LYMPHOCYTES # SPEC AUTO: 1.1 K/UL (ref 1–4.8)
LYMPHOCYTES NFR SPEC AUTO: 8 %
MAGNESIUM SPEC-SCNC: 1.5 MG/DL (ref 1.6–2.3)
MCH RBC QN AUTO: 28.4 PG (ref 25–35)
MCHC RBC AUTO-ENTMCNC: 31 G/DL (ref 31–37)
MCV RBC AUTO: 91.8 FL (ref 80–100)
MONOCYTES # BLD AUTO: 0.6 K/UL (ref 0–1)
MONOCYTES NFR BLD AUTO: 4 %
NEUTROPHILS # BLD AUTO: 11.4 K/UL (ref 1.3–7.7)
NEUTROPHILS NFR BLD AUTO: 86 %
PLATELET # BLD AUTO: 242 K/UL (ref 150–450)
POTASSIUM SERPL-SCNC: 4.9 MMOL/L (ref 3.5–5.1)
SODIUM SERPL-SCNC: 137 MMOL/L (ref 137–145)
WBC # BLD AUTO: 13.2 K/UL (ref 3.8–10.6)

## 2021-04-11 PROCEDURE — 3E0333Z INTRODUCTION OF ANTI-INFLAMMATORY INTO PERIPHERAL VEIN, PERCUTANEOUS APPROACH: ICD-10-PCS

## 2021-04-11 RX ADMIN — MAGNESIUM SULFATE IN DEXTROSE SCH MLS/HR: 10 INJECTION, SOLUTION INTRAVENOUS at 12:14

## 2021-04-11 RX ADMIN — Medication SCH MG: at 12:11

## 2021-04-11 RX ADMIN — OXYCODONE HYDROCHLORIDE AND ACETAMINOPHEN SCH MG: 500 TABLET ORAL at 12:12

## 2021-04-11 RX ADMIN — INSULIN ASPART SCH UNIT: 100 INJECTION, SOLUTION INTRAVENOUS; SUBCUTANEOUS at 20:46

## 2021-04-11 RX ADMIN — MEMANTINE HYDROCHLORIDE SCH MG: 10 TABLET ORAL at 12:12

## 2021-04-11 RX ADMIN — ALBUTEROL SULFATE SCH: 90 AEROSOL, METERED RESPIRATORY (INHALATION) at 13:32

## 2021-04-11 RX ADMIN — ALBUTEROL SULFATE SCH PUFF: 90 AEROSOL, METERED RESPIRATORY (INHALATION) at 16:50

## 2021-04-11 RX ADMIN — Medication SCH MCG: at 12:12

## 2021-04-11 RX ADMIN — ASPIRIN 81 MG CHEWABLE TABLET SCH MG: 81 TABLET CHEWABLE at 12:13

## 2021-04-11 RX ADMIN — ATORVASTATIN CALCIUM SCH MG: 40 TABLET, FILM COATED ORAL at 20:35

## 2021-04-11 RX ADMIN — ALBUTEROL SULFATE SCH PUFF: 90 AEROSOL, METERED RESPIRATORY (INHALATION) at 20:04

## 2021-04-11 RX ADMIN — DONEPEZIL HYDROCHLORIDE SCH MG: 10 TABLET ORAL at 20:45

## 2021-04-11 RX ADMIN — FUROSEMIDE SCH MG: 40 TABLET ORAL at 12:11

## 2021-04-11 RX ADMIN — INSULIN DETEMIR SCH UNIT: 100 INJECTION, SOLUTION SUBCUTANEOUS at 13:31

## 2021-04-11 RX ADMIN — Medication SCH MG: at 20:35

## 2021-04-11 RX ADMIN — SOTALOL HYDROCHLORIDE SCH: 80 TABLET ORAL at 13:27

## 2021-04-11 RX ADMIN — MEMANTINE HYDROCHLORIDE SCH MG: 10 TABLET ORAL at 20:45

## 2021-04-11 RX ADMIN — INSULIN ASPART SCH UNIT: 100 INJECTION, SOLUTION INTRAVENOUS; SUBCUTANEOUS at 17:27

## 2021-04-11 RX ADMIN — MAGNESIUM SULFATE IN DEXTROSE SCH MLS/HR: 10 INJECTION, SOLUTION INTRAVENOUS at 13:30

## 2021-04-11 RX ADMIN — SOTALOL HYDROCHLORIDE SCH MG: 80 TABLET ORAL at 14:29

## 2021-04-11 RX ADMIN — APIXABAN SCH MG: 5 TABLET, FILM COATED ORAL at 12:11

## 2021-04-11 RX ADMIN — INSULIN DETEMIR SCH: 100 INJECTION, SOLUTION SUBCUTANEOUS at 13:18

## 2021-04-11 RX ADMIN — ALBUTEROL SULFATE SCH: 90 AEROSOL, METERED RESPIRATORY (INHALATION) at 09:36

## 2021-04-11 RX ADMIN — ALBUTEROL SULFATE SCH PUFF: 90 AEROSOL, METERED RESPIRATORY (INHALATION) at 03:39

## 2021-04-11 RX ADMIN — DEXTROSE SCH MG: 50 INJECTION, SOLUTION INTRAVENOUS at 12:13

## 2021-04-11 RX ADMIN — SOTALOL HYDROCHLORIDE SCH MG: 80 TABLET ORAL at 20:45

## 2021-04-11 RX ADMIN — APIXABAN SCH MG: 5 TABLET, FILM COATED ORAL at 20:35

## 2021-04-11 RX ADMIN — INSULIN ASPART SCH UNIT: 100 INJECTION, SOLUTION INTRAVENOUS; SUBCUTANEOUS at 12:13

## 2021-04-11 RX ADMIN — INSULIN ASPART SCH: 100 INJECTION, SOLUTION INTRAVENOUS; SUBCUTANEOUS at 11:45

## 2021-04-11 NOTE — P.PN
Subjective


Progress Note Date: 04/11/21





Patient is feeling fairly well today.  He denies any shortness of breath.  His 

O2 sats was 98% on 5 L of oxygen via nasal cannula.  I decreased his oxygen to 2

L and O2 sats remained around 94%.





Patient appeared confused at times and told me that he got COVID-19 vaccine 

approximately 4 month ago.  When I discussed with him further and I told him 

that the vaccine has not been available for 4 months and whether he is confusing

with influenza vaccine he said the [facility] that's the case.





Objective





- Vital Signs


Vital signs: 


                                   Vital Signs











Temp  98.7 F   04/11/21 13:32


 


Pulse  81   04/11/21 13:32


 


Resp  18   04/11/21 13:32


 


BP  164/85   04/11/21 13:32


 


Pulse Ox  96   04/11/21 13:32








                                 Intake & Output











 04/10/21 04/11/21 04/11/21





 18:59 06:59 18:59


 


Weight 90.718 kg  














- Exam





General:  The patient is awake and alert, in no distress


Eye: there is normal conjunctiva bilaterally.  


Neck:  The neck is supple, there is no  JVD.  


Cardiovascular:  Normal  S1-S2, no S3-S4, no murmurs.


Respiratory: Lungs clear to auscultation bilaterally


Gastrointestinal: Abdomen is soft, nontender


Musculoskeletal:  There is no pedal edema.  


Neurological:. Speech is normal. 


Skin:  Skin is warm and dry 





- Labs


CBC & Chem 7: 


                                 04/11/21 05:34





                                 04/11/21 05:34


Labs: 


                  Abnormal Lab Results - Last 24 Hours (Table)











  04/10/21 04/10/21 04/10/21 Range/Units





  18:50 18:57 18:57 


 


WBC   19.5 H   (3.8-10.6)  k/uL


 


Neutrophils #   15.9 H   (1.3-7.7)  k/uL


 


D-Dimer    0.79 H  (<0.60)  mg/L FEU


 


VBG HCO3     (24-28)  mmol/L


 


Sodium     (137-145)  mmol/L


 


Carbon Dioxide     (22-30)  mmol/L


 


Glucose     (74-99)  mg/dL


 


POC Glucose (mg/dL)  197 H    (75-99)  mg/dL


 


Magnesium     (1.6-2.3)  mg/dL


 


C-Reactive Protein     (<10.0)  mg/L


 


Procalcitonin     (0.02-0.09)  ng/mL


 


Coronavirus (PCR)     (Not Detectd)  














  04/10/21 04/10/21 04/10/21 Range/Units





  18:57 18:57 18:57 


 


WBC     (3.8-10.6)  k/uL


 


Neutrophils #     (1.3-7.7)  k/uL


 


D-Dimer     (<0.60)  mg/L FEU


 


VBG HCO3    21 L  (24-28)  mmol/L


 


Sodium  136 L    (137-145)  mmol/L


 


Carbon Dioxide  20 L    (22-30)  mmol/L


 


Glucose  219 H    (74-99)  mg/dL


 


POC Glucose (mg/dL)     (75-99)  mg/dL


 


Magnesium  1.5 L    (1.6-2.3)  mg/dL


 


C-Reactive Protein  19.5 H    (<10.0)  mg/L


 


Procalcitonin   0.10 H   (0.02-0.09)  ng/mL


 


Coronavirus (PCR)     (Not Detectd)  














  04/10/21 04/11/21 04/11/21 Range/Units





  22:44 05:34 05:34 


 


WBC   13.2 H   (3.8-10.6)  k/uL


 


Neutrophils #   11.4 H   (1.3-7.7)  k/uL


 


D-Dimer     (<0.60)  mg/L FEU


 


VBG HCO3     (24-28)  mmol/L


 


Sodium     (137-145)  mmol/L


 


Carbon Dioxide     (22-30)  mmol/L


 


Glucose    176 H  (74-99)  mg/dL


 


POC Glucose (mg/dL)     (75-99)  mg/dL


 


Magnesium    1.5 L  (1.6-2.3)  mg/dL


 


C-Reactive Protein     (<10.0)  mg/L


 


Procalcitonin     (0.02-0.09)  ng/mL


 


Coronavirus (PCR)  Detected A    (Not Detectd)  














  04/11/21 04/11/21 Range/Units





  06:45 11:19 


 


WBC    (3.8-10.6)  k/uL


 


Neutrophils #    (1.3-7.7)  k/uL


 


D-Dimer    (<0.60)  mg/L FEU


 


VBG HCO3    (24-28)  mmol/L


 


Sodium    (137-145)  mmol/L


 


Carbon Dioxide    (22-30)  mmol/L


 


Glucose    (74-99)  mg/dL


 


POC Glucose (mg/dL)  140 H  188 H  (75-99)  mg/dL


 


Magnesium    (1.6-2.3)  mg/dL


 


C-Reactive Protein    (<10.0)  mg/L


 


Procalcitonin    (0.02-0.09)  ng/mL


 


Coronavirus (PCR)    (Not Detectd)  














Assessment and Plan


Assessment: 





This is a 86-year-old male with past medical history noted below who presented 

to the emergency room with worsening shortness of breath and hypoxia.  Patient 

was evaluated in the ER and admitted to the hospital for further management of 

his medical problems noted below.





1. COVID-19 pneumonia, patient was seen and evaluated by pulmonology.  Started 

on IV Decadron 6 mg daily day #1.  Continue vitamin C, vitamin D, zinc, and 

melatonin.





2.  Acute hypoxic respiratory failure, and wean off O2 as tolerated for O2 sat 

greater than 90%





3.  Hypomagnesemia, replaced.  Recheck lab work in the morning





4.  Chronic atrial fibrillation on anticoagulation with Eliquis





5.  Type 2 diabetes, continue home dose of insulin plus sliding scale





6.  Chronic medical problems: Coronary artery disease, history of CABG, underl

shanna cognitive impairment/dementia, underlying depression, chronic systolic 

heart failure with no evidence of exacerbation





Today, I reviewed his medication list and lab work results.  Inflammatory ma

rkers only slightly elevated.  We will continue supportive care.  Repeat lab 

work in the morning.

## 2021-04-11 NOTE — P.CNPUL
History of Present Illness


Consult date: 04/11/21


Requesting physician: Josselin Freedman


Reason for consult: dyspnea


Chief complaint: Shortness of breath, COVID 19 pneumonia


History of present illness: 


 86-year-old white male patient who is a poor historian, presented to the 

emergency department on 04/10/2021 per EMS for evaluation of worsening shortness

of breath, when EMS arrived he was hypoxemic at the scene, and having 

respiratory distress, he was placed on BiPAP support, and transported to the 

emergency department.  Apparently one of the patient's family members recently 

tested positive for COVID 19.  Chest x-ray showed bilateral infiltrates.  CT 

chest showed no acute pulmonary embolism, but did show bilateral diffuse patchy 

groundglass opacities predominantly in the lower lobes concerning for COVID 

pneumonia, initial COVID 19 PCR the emergency department on 04/10/2021 was 

negative, and a repeat PCR test on 04/11/2021 was found to be positive.  His 

admission blood work showed a white blood cell, 19.5, hemoglobin 16.7, d-dimer 

0.79, sodium is 136, potassium is 4.9, chloride is 106, CO2 is 20, BUN is 12, 

creatinine 0.73, lactic acid was 1.7, LFTs within normal limits, troponin was 

negative at less than 0.012, CRP was 19.5, proBNP was 2450, progesterone level 

was negative at 0.10.  Patient states that he states he was vaccinated for COVD 

19, he states that his last dose was 2 months ago.  His breathing has improved, 

he is currently off BiPAP support which was previously at 14/5 and FiO2 of 30%, 

was given a dose of Levaquin, and was after switched to azithromycin, currently 

breathing comfortably, he is on 4 L of oxygen and his pulse ox is 98%, no 

wheezing or chest congestion, occasional cough, breathing comfortably.  His time

on of symptoms is unclear.  We'll continue supportive treatment,








Review of Systems


All systems: negative


Constitutional: Denies chills, Denies fever


Eyes: denies blurred vision, denies pain


Ears, nose, mouth and throat: Denies headache, Denies sore throat


Cardiovascular: Denies chest pain, Denies shortness of breath


Respiratory: Reports cough, Reports dyspnea


Gastrointestinal: Denies abdominal pain, Denies diarrhea, Denies nausea, Denies 

vomiting


Musculoskeletal: Denies myalgias


Integumentary: Denies pruritus, Denies rash


Neurological: Denies numbness, Denies weakness


Psychiatric: Denies anxiety, Denies depression


Endocrine: Denies fatigue, Denies weight change





Past Medical History


Past Medical History: Atrial Fibrillation, Heart Failure, Dementia, Diabetes 

Mellitus


Additional Past Medical History / Comment(s): CHF


History of Any Multi-Drug Resistant Organisms: None Reported


Past Surgical History: Coronary Bypass/CABG


Additional Past Surgical History / Comment(s): Stents


Past Anesthesia/Blood Transfusion Reactions: No Reported Reaction


Past Psychological History: Depression


Smoking Status: Never smoker


Past Alcohol Use History: None Reported


Past Drug Use History: None Reported





- Past Family History


  ** Father


History Unknown: Yes





Medications and Allergies


                                Home Medications











 Medication  Instructions  Recorded  Confirmed  Type


 


Acetaminophen/Diphenhydramine 1 tab PO HS 10/12/20 04/10/21 History





[Tylenol -25mg]    


 


Ammonium Lactate 12%/Aquaphor  1 applic TOPICAL DAILY PRN 10/12/20 04/10/21 

History


 


Apixaban [Eliquis] 5 mg PO BID 10/12/20 04/10/21 History


 


Aspirin 81 mg PO DAILY 10/12/20 04/10/21 History


 


Atorvastatin [Lipitor] 40 mg PO HS 10/12/20 04/10/21 History


 


Canagliflozin [Invokana] 300 mg PO DAILY 10/12/20 04/10/21 History


 


Cholecalciferol [Vitamin D3 (25 50 mcg PO BID 10/12/20 04/10/21 History





Mcg = 1000 Iu)]    


 


Donepezil [Aricept] 10 mg PO HS 10/12/20 04/10/21 History


 


Ferrous Sulfate [Iron (65  mg PO Q48H 10/12/20 04/10/21 History





Elemental)]    


 


Furosemide [Lasix] 40 mg PO DAILY 10/12/20 04/10/21 History


 


Insulin Glargine,Hum.rec.anlog 58 unit SQ DAILY 10/12/20 04/10/21 History





[Lantus Solostar]    


 


Ketoconazole 2% Cream [Nizoral 2%] 1 applic TOPICAL DAILY PRN 10/12/20 04/10/21 

History


 


Memantine HCl [Memantine HCl ER] 28 mg PO DAILY 10/12/20 04/10/21 History


 


Nitroglycerin Sl Tabs [Nitrostat] 0.4 mg SL Q5M PRN 10/12/20 04/10/21 History


 


Nitroglycerin [Nitroglycerin 1 spray TRANSLINGU Q5M PRN 10/12/20 04/10/21 

History





400MCG Spray]    


 


Omeprazole Magnesium [PriLOSEC OTC] 20 mg PO DAILY 10/12/20 04/10/21 History


 


Sertraline HCl [Zoloft] 50 mg PO HS 10/12/20 04/10/21 History


 


Sotalol [Betapace] 80 mg PO BID 10/12/20 04/10/21 History


 


Ubidecarenone [Co Q-10] 100 mg PO HS 10/12/20 04/10/21 History


 


Vitamin B Complex 1 cap PO DAILY 10/12/20 04/10/21 History


 


lisinopriL [Zestril] 2.5 mg PO DAILY 10/12/20 04/10/21 History


 


metFORMIN HCL [Glucophage] 500 mg PO DAILY 10/12/20 04/10/21 History


 


sitaGLIPtin [Januvia] 100 mg PO DAILY 10/12/20 04/10/21 History


 


Cetirizine HCl [Zyrtec] 10 mg PO HS 04/10/21 04/10/21 History


 


Clotrimazole/Betamethasone Dip 1 applic TOPICAL DAILY 04/10/21 04/10/21 History





[Lotrisone Cream]    








                                    Allergies











Allergy/AdvReac Type Severity Reaction Status Date / Time


 


Penicillins Allergy  Unknown Verified 04/10/21 20:32





   Childhood  














Physical Exam


Vitals: 


                                   Vital Signs











  Temp Pulse Resp BP Pulse Ox


 


 04/11/21 09:16   79  18  149/69  98


 


 04/11/21 07:28  98.3 F  77  20  145/69  98


 


 04/11/21 06:46   81   184/84 


 


 04/11/21 00:16      96


 


 04/10/21 20:00   71  20  122/55  96


 


 04/10/21 19:28   84   


 


 04/10/21 19:07   78   


 


 04/10/21 19:02   71  24  142/69  93 L


 


 04/10/21 18:50   80  24  146/76  87 L








                                Intake and Output











 04/10/21 04/11/21 04/11/21





 22:59 06:59 14:59


 


Other:   


 


  Weight 90.718 kg  











 GENERAL EXAM: Alert, pleasant, 86-year-old white male, currently on 4 L of 

oxygen pulse ox of 98%, was previously on BiPAP support with pressures of 14/5 

and FiO2 of 30%, comfortable in no apparent distress.  Patient is a poor 

historian, he is oriented to person, and place, however has poor recollection of

his symptoms, and the symptom timeline


HEAD: Normocephalic/atraumatic.


EYES: Normal reaction of pupils, equal size.  Conjunctiva pink, sclera white.


NOSE: Clear with pink turbinates.


THROAT: No erythema or exudates.


NECK: No masses, no JVD, no thyroid enlargement, no adenopathy.


CHEST: No chest wall deformity.  Symmetrical expansion. 


LUNGS: Equal air entry with bilateral crackles, no wheezes


CVS: Regular rate and rhythm, normal S1 and S2, no gallops, no murmurs, no rubs


ABDOMEN: Soft, nontender.  No hepatosplenomegaly, normal bowel sounds, no 

guarding or rigidity.


EXTREMITIES: No clubbing, no edema, no cyanosis, 2+ pulses and upper and lower 

extremities.


MUSCULOSKELETAL: Muscle strength and tone normal.


SPINE: No scoliosis or deformity


SKIN: No rashes


CENTRAL NERVOUS SYSTEM: Alert and oriented -2.  No focal deficits, tone is 

normal in all 4 extremities.











Results





- Laboratory Findings


CBC and BMP: 


                                 04/11/21 05:34





                                 04/11/21 05:34


PT/INR, D-dimer











PT  11.1 sec (9.0-12.0)   04/10/21  18:57    


 


INR  1.0  (<1.2)   04/10/21  18:57    


 


D-Dimer  0.79 mg/L FEU (<0.60)  H  04/10/21  18:57    








Abnormal lab findings: 


                                  Abnormal Labs











  04/10/21 04/10/21 04/10/21





  18:50 18:57 18:57


 


WBC   19.5 H 


 


Neutrophils #   15.9 H 


 


D-Dimer    0.79 H


 


VBG HCO3   


 


Sodium   


 


Carbon Dioxide   


 


Glucose   


 


POC Glucose (mg/dL)  197 H  


 


Magnesium   


 


C-Reactive Protein   


 


Procalcitonin   


 


Coronavirus (PCR)   














  04/10/21 04/10/21 04/10/21





  18:57 18:57 18:57


 


WBC   


 


Neutrophils #   


 


D-Dimer   


 


VBG HCO3    21 L


 


Sodium  136 L  


 


Carbon Dioxide  20 L  


 


Glucose  219 H  


 


POC Glucose (mg/dL)   


 


Magnesium  1.5 L  


 


C-Reactive Protein  19.5 H  


 


Procalcitonin   0.10 H 


 


Coronavirus (PCR)   














  04/10/21 04/11/21 04/11/21





  22:44 05:34 05:34


 


WBC   13.2 H 


 


Neutrophils #   11.4 H 


 


D-Dimer   


 


VBG HCO3   


 


Sodium   


 


Carbon Dioxide   


 


Glucose    176 H


 


POC Glucose (mg/dL)   


 


Magnesium    1.5 L


 


C-Reactive Protein   


 


Procalcitonin   


 


Coronavirus (PCR)  Detected A  














  04/11/21 04/11/21





  06:45 11:19


 


WBC  


 


Neutrophils #  


 


D-Dimer  


 


VBG HCO3  


 


Sodium  


 


Carbon Dioxide  


 


Glucose  


 


POC Glucose (mg/dL)  140 H  188 H


 


Magnesium  


 


C-Reactive Protein  


 


Procalcitonin  


 


Coronavirus (PCR)  














- Diagnostic Findings


Chest x-ray: report reviewed, image reviewed


CT scan - chest: report reviewed, image reviewed


Additional studies: 


 EKG reviewed 








Assessment and Plan


Plan: 


 Assessment:





#1.  Acute hypoxic respiratory failure related to acute COVID 19 pneumonia, 

timeline of symptom onset is unclear





#2.  History of A. fib, currently in sinus mechanism, on Eliquis





#3.  Diabetes mellitus type 2





#4.  Hypertension





#5.  Previous history of CHF, with systolic dysfunction





#6.  Coronary artery disease with previous history of bypass grafting





#7.  Depression





#8.  History of dementia





Plan:





Patient has responded rapidly to BiPAP support, steroids, feeling much better, 

currently on nasal cannula, reading comfortably, we'll continue current 

treatment, his timeline of symptoms is unclear, we'll continue supportive 

treatment at this time.  We'll resume his Eliquis, vitamins, continue Decadron, 

we'll continue conservative treatment.  Code status is DO NOT RESUSCITATE





I performed a history & physical examination of the patient and discussed their 

management with my nurse practitioner, Maria Esther Wolff.  I reviewed the nurse 

practitioner's note and agree with the documented findings and plan of care.  

Lung sounds are positive for diminished breath sounds..  The findings and the 

impression was discussed with the patient.  I attest to the documentation by the

nurse practitioner. 





Time with Patient: Greater than 30

## 2021-04-12 LAB
ANION GAP SERPL CALC-SCNC: 5 MMOL/L
BASOPHILS # BLD AUTO: 0.1 K/UL (ref 0–0.2)
BASOPHILS NFR BLD AUTO: 1 %
BUN SERPL-SCNC: 15 MG/DL (ref 9–20)
CALCIUM SPEC-MCNC: 9.9 MG/DL (ref 8.4–10.2)
CHLORIDE SERPL-SCNC: 101 MMOL/L (ref 98–107)
CO2 SERPL-SCNC: 30 MMOL/L (ref 22–30)
EOSINOPHIL # BLD AUTO: 0.4 K/UL (ref 0–0.7)
EOSINOPHIL NFR BLD AUTO: 3 %
ERYTHROCYTE [DISTWIDTH] IN BLOOD BY AUTOMATED COUNT: 4.94 M/UL (ref 4.3–5.9)
ERYTHROCYTE [DISTWIDTH] IN BLOOD: 14.3 % (ref 11.5–15.5)
GLUCOSE BLD-MCNC: 150 MG/DL (ref 75–99)
GLUCOSE BLD-MCNC: 190 MG/DL (ref 75–99)
GLUCOSE BLD-MCNC: 302 MG/DL (ref 75–99)
GLUCOSE BLD-MCNC: 310 MG/DL (ref 75–99)
GLUCOSE SERPL-MCNC: 156 MG/DL (ref 74–99)
HCT VFR BLD AUTO: 44.1 % (ref 39–53)
HGB BLD-MCNC: 14.3 GM/DL (ref 13–17.5)
LYMPHOCYTES # SPEC AUTO: 2.1 K/UL (ref 1–4.8)
LYMPHOCYTES NFR SPEC AUTO: 15 %
MAGNESIUM SPEC-SCNC: 2 MG/DL (ref 1.6–2.3)
MCH RBC QN AUTO: 29 PG (ref 25–35)
MCHC RBC AUTO-ENTMCNC: 32.4 G/DL (ref 31–37)
MCV RBC AUTO: 89.4 FL (ref 80–100)
MONOCYTES # BLD AUTO: 0.8 K/UL (ref 0–1)
MONOCYTES NFR BLD AUTO: 6 %
NEUTROPHILS # BLD AUTO: 10.7 K/UL (ref 1.3–7.7)
NEUTROPHILS NFR BLD AUTO: 76 %
PLATELET # BLD AUTO: 236 K/UL (ref 150–450)
POTASSIUM SERPL-SCNC: 4.2 MMOL/L (ref 3.5–5.1)
SODIUM SERPL-SCNC: 136 MMOL/L (ref 137–145)
WBC # BLD AUTO: 14.1 K/UL (ref 3.8–10.6)

## 2021-04-12 RX ADMIN — Medication SCH MG: at 20:08

## 2021-04-12 RX ADMIN — Medication SCH MCG: at 08:31

## 2021-04-12 RX ADMIN — DEXTROSE SCH MG: 50 INJECTION, SOLUTION INTRAVENOUS at 08:32

## 2021-04-12 RX ADMIN — INSULIN ASPART SCH UNIT: 100 INJECTION, SOLUTION INTRAVENOUS; SUBCUTANEOUS at 20:09

## 2021-04-12 RX ADMIN — INSULIN DETEMIR SCH UNIT: 100 INJECTION, SOLUTION SUBCUTANEOUS at 08:33

## 2021-04-12 RX ADMIN — ASPIRIN 81 MG CHEWABLE TABLET SCH MG: 81 TABLET CHEWABLE at 08:31

## 2021-04-12 RX ADMIN — SOTALOL HYDROCHLORIDE SCH MG: 80 TABLET ORAL at 20:09

## 2021-04-12 RX ADMIN — SOTALOL HYDROCHLORIDE SCH MG: 80 TABLET ORAL at 08:32

## 2021-04-12 RX ADMIN — APIXABAN SCH MG: 5 TABLET, FILM COATED ORAL at 20:08

## 2021-04-12 RX ADMIN — ALBUTEROL SULFATE SCH PUFF: 90 AEROSOL, METERED RESPIRATORY (INHALATION) at 12:32

## 2021-04-12 RX ADMIN — DONEPEZIL HYDROCHLORIDE SCH MG: 10 TABLET ORAL at 20:09

## 2021-04-12 RX ADMIN — ALBUTEROL SULFATE SCH PUFF: 90 AEROSOL, METERED RESPIRATORY (INHALATION) at 20:00

## 2021-04-12 RX ADMIN — INSULIN ASPART SCH UNIT: 100 INJECTION, SOLUTION INTRAVENOUS; SUBCUTANEOUS at 12:35

## 2021-04-12 RX ADMIN — INSULIN ASPART SCH UNIT: 100 INJECTION, SOLUTION INTRAVENOUS; SUBCUTANEOUS at 08:32

## 2021-04-12 RX ADMIN — FUROSEMIDE SCH MG: 40 TABLET ORAL at 08:32

## 2021-04-12 RX ADMIN — INSULIN ASPART SCH UNIT: 100 INJECTION, SOLUTION INTRAVENOUS; SUBCUTANEOUS at 17:29

## 2021-04-12 RX ADMIN — ALBUTEROL SULFATE SCH PUFF: 90 AEROSOL, METERED RESPIRATORY (INHALATION) at 08:54

## 2021-04-12 RX ADMIN — ALBUTEROL SULFATE SCH PUFF: 90 AEROSOL, METERED RESPIRATORY (INHALATION) at 16:00

## 2021-04-12 RX ADMIN — APIXABAN SCH MG: 5 TABLET, FILM COATED ORAL at 08:32

## 2021-04-12 RX ADMIN — MEMANTINE HYDROCHLORIDE SCH MG: 10 TABLET ORAL at 08:31

## 2021-04-12 RX ADMIN — OXYCODONE HYDROCHLORIDE AND ACETAMINOPHEN SCH MG: 500 TABLET ORAL at 08:31

## 2021-04-12 RX ADMIN — ATORVASTATIN CALCIUM SCH MG: 40 TABLET, FILM COATED ORAL at 20:08

## 2021-04-12 RX ADMIN — Medication SCH MG: at 08:32

## 2021-04-12 RX ADMIN — MEMANTINE HYDROCHLORIDE SCH MG: 10 TABLET ORAL at 20:09

## 2021-04-12 NOTE — XR
EXAMINATION TYPE: XR lumbar spine 2 or 3V

 

DATE OF EXAM: 4/12/2021

 

CLINICAL HISTORY: Fall injury with pain

 

TECHNIQUE: Frontal and lateral images of the lumbar spine are obtained.

 

COMPARISON: CT abdomen and pelvis October 12, 2020

 

FINDINGS:  There are 5 lumbar type vertebral bodies redemonstrated. Vertebral body heights fairly wel
l maintained. The lumbar spine shows persistent grade 1 retrolisthesis L2 on L3. There is moderate di
sc space narrowing with endplate sclerosis and moderate anterior and lateral spurring at this level r
edemonstrated. There is mild to moderate disc space narrowing and vacuum disc phenomenon at L4-L5 lev
el redemonstrated. Calcified gallstones right upper quadrant redemonstrated. Moderate-to-severe ather
osclerotic change of the mid to distal abdominal aorta extending into the iliac branch vessels again 
seen.

 

IMPRESSION:  No acute displaced fracture in the lumbar spine is identified.

## 2021-04-12 NOTE — P.EN
RN notified me regarding patient falling off the commode.





Patient denies any complaints at this time he denies any head injury is alert 

oriented to place, he is complaining of chronic low back pain that's not worse 

than his baseline.


On exam patient awake alert and follows commands denies any focal neuro deficits


No point tenderness upon examination of his spine, no skin changes over the 

lumbar area where he claims that he had his back upon falling


No leg edema bilaterally


Lungs clear to auscultation bilaterally


Normocephalic atraumatic





 check lumbosacral x-ray


Fall precautions, activate bed alarm


Patient encouraged to hit the nurse call light when he needs any help for his 

safety and to avoid any future falls





Continue to monitor and follow up

## 2021-04-12 NOTE — P.PN
Subjective


Progress Note Date: 04/12/21





 86-year-old white male patient who is a poor historian, presented to the 

emergency department on 04/10/2021 per EMS for evaluation of worsening shortness

of breath, when EMS arrived he was hypoxemic at the scene, and having 

respiratory distress, he was placed on BiPAP support, and transported to the 

emergency department.  Apparently one of the patient's family members recently 

tested positive for COVID 19.  Chest x-ray showed bilateral infiltrates.  CT 

chest showed no acute pulmonary embolism, but did show bilateral diffuse patchy 

groundglass opacities predominantly in the lower lobes concerning for COVID 

pneumonia, initial COVID 19 PCR the emergency department on 04/10/2021 was 

negative, and a repeat PCR test on 04/11/2021 was found to be positive.  His 

admission blood work showed a white blood cell, 19.5, hemoglobin 16.7, d-dimer 

0.79, sodium is 136, potassium is 4.9, chloride is 106, CO2 is 20, BUN is 12, 

creatinine 0.73, lactic acid was 1.7, LFTs within normal limits, troponin was 

negative at less than 0.012, CRP was 19.5, proBNP was 2450, progesterone level 

was negative at 0.10.  Patient states that he states he was vaccinated for COVD 

19, he states that his last dose was 2 months ago.  His breathing has improved, 

he is currently off BiPAP support which was previously at 14/5 and FiO2 of 30%, 

was given a dose of Levaquin, and was after switched to azithromycin, currently 

breathing comfortably, he is on 3 L of oxygen and his pulse ox is 98%, no 

wheezing or chest congestion, occasional cough, breathing comfortably.  His time

on of symptoms is unclear.  The patient had a about a fall yesterday when he 

fell off the commode and the patient is going to undergo a x-ray of the lumbar 

spine. 





Objective





- Vital Signs


Vital signs: 


                                   Vital Signs











Temp  97.4 F L  04/12/21 08:00


 


Pulse  63   04/12/21 08:00


 


Resp  18   04/12/21 08:00


 


BP  146/63   04/12/21 08:00


 


Pulse Ox  96   04/12/21 08:00








                                 Intake & Output











 04/11/21 04/12/21 04/12/21





 18:59 06:59 18:59


 


Intake Total 150  


 


Output Total  700 


 


Balance 150 -700 


 


Weight 90.718 kg  


 


Intake:   


 


  Oral 150  


 


Output:   


 


  Urine  700 


 


Other:   


 


  Voiding Method  Urinal 





  Diaper 


 


  # Voids 3  














- Exam








 GENERAL EXAM: Alert, pleasant, 86-year-old white male, currently on 3 L of 

oxygen pulse ox of 98%, was previously on BiPAP support with pressures of 14/5 

and FiO2 of 30%, comfortable in no apparent distress.  Patient is a poor 

historian, he is oriented to person, and place, however has poor recollection of

his symptoms, and the symptom timeline


HEAD: Normocephalic/atraumatic.


EYES: Normal reaction of pupils, equal size.  Conjunctiva pink, sclera white.


NOSE: Clear with pink turbinates.


THROAT: No erythema or exudates.


NECK: No masses, no JVD, no thyroid enlargement, no adenopathy.


CHEST: No chest wall deformity.  Symmetrical expansion. 


LUNGS: Equal air entry with bilateral crackles, no wheezes


CVS: Regular rate and rhythm, normal S1 and S2, no gallops, no murmurs, no rubs


ABDOMEN: Soft, nontender.  No hepatosplenomegaly, normal bowel sounds, no 

guarding or rigidity.


EXTREMITIES: No clubbing, no edema, no cyanosis, 2+ pulses and upper and lower 

extremities.


MUSCULOSKELETAL: Muscle strength and tone normal.


SPINE: No scoliosis or deformity


SKIN: No rashes


CENTRAL NERVOUS SYSTEM: Alert and oriented -2.  No focal deficits, tone is 

normal in all 4 extremities.





- Labs


CBC & Chem 7: 


                                 04/12/21 06:54





                                 04/12/21 06:54


Labs: 


                  Abnormal Lab Results - Last 24 Hours (Table)











  04/10/21 04/11/21 04/11/21 Range/Units





  18:57 05:34 11:19 


 


WBC     (3.8-10.6)  k/uL


 


Neutrophils #     (1.3-7.7)  k/uL


 


Sodium     (137-145)  mmol/L


 


Glucose     (74-99)  mg/dL


 


POC Glucose (mg/dL)    188 H  (75-99)  mg/dL


 


Hemoglobin A1c   7.4 H   (4.0-6.0)  %


 


Procalcitonin  0.10 H    (0.02-0.09)  ng/mL














  04/11/21 04/11/21 04/12/21 Range/Units





  16:35 20:39 06:54 


 


WBC    14.1 H  (3.8-10.6)  k/uL


 


Neutrophils #    10.7 H  (1.3-7.7)  k/uL


 


Sodium     (137-145)  mmol/L


 


Glucose     (74-99)  mg/dL


 


POC Glucose (mg/dL)  262 H  240 H   (75-99)  mg/dL


 


Hemoglobin A1c     (4.0-6.0)  %


 


Procalcitonin     (0.02-0.09)  ng/mL














  04/12/21 04/12/21 Range/Units





  06:54 07:27 


 


WBC    (3.8-10.6)  k/uL


 


Neutrophils #    (1.3-7.7)  k/uL


 


Sodium  136 L   (137-145)  mmol/L


 


Glucose  156 H   (74-99)  mg/dL


 


POC Glucose (mg/dL)   150 H  (75-99)  mg/dL


 


Hemoglobin A1c    (4.0-6.0)  %


 


Procalcitonin    (0.02-0.09)  ng/mL








                      Microbiology - Last 24 Hours (Table)











 04/10/21 18:57 Blood Culture - Preliminary





 Blood    No Growth after 24 hours


 


 04/10/21 18:58 Blood Culture - Preliminary





 Blood    No Growth after 24 hours














Assessment and Plan


Plan: 








#1.  Acute hypoxic respiratory failure related to acute COVID 19 pneumonia, 

timeline of symptom onset is unclear, currently on 3 L of oxygen by nasal 

cannula.  The patient was on BiPAP at a time of admission and she was weaned 

down to oxygen by nasal cannula.  Computed tomography scan of the chest showed 

bilateral pulmonary infiltrates most on the lung bases bilaterally.  This is 

typical of underlying Covid pneumonia.





#2.  History of A. fib, currently in sinus mechanism, on Eliquis





#3.  Diabetes mellitus type 2 , with a component of steroid-induced 

hyperglycemia





#4.  Hypertension





#5.  Previous history of CHF, with systolic dysfunction





#6.  Coronary artery disease with previous history of bypass grafting





#7.  Depression





#8.  History of dementia





#9, fall





Plan:








Continue Decadron


Continue long-term and to coagulation with Eliquis as the patient has history of

chronic atrial fibrillation


Titrate FiO2 to maintain saturation above 90%


Monitoring telemetry monitors


Monitor blood sugars supportive care including multivitamins


DNR/DNI CODE STATUS


Complete x-ray of the lumbosacral spine


Pulmonary and critical care services with sign off the case and will leave the 

rest of the management up to medicine.  He is currently only on 3 L about 2 by 

nasal cannula.

## 2021-04-12 NOTE — P.PN
Subjective


Progress Note Date: 04/12/21





Patient had a fall last night.  He was evaluated and underwent lumbar x-ray 

showing no acute findings.  Patient is doing fairly well today.  He is still on 

2 L of oxygen satting 94%.  He is very weak and unable to ambulate by himself.





Objective





- Vital Signs


Vital signs: 


                                   Vital Signs











Temp  97.4 F L  04/12/21 08:00


 


Pulse  63   04/12/21 08:00


 


Resp  18   04/12/21 08:00


 


BP  146/63   04/12/21 08:00


 


Pulse Ox  96   04/12/21 08:00








                                 Intake & Output











 04/11/21 04/12/21 04/12/21





 18:59 06:59 18:59


 


Intake Total 150  


 


Output Total  700 


 


Balance 150 -700 


 


Weight 90.718 kg  


 


Intake:   


 


  Oral 150  


 


Output:   


 


  Urine  700 


 


Other:   


 


  Voiding Method  Urinal Urinal





  Diaper Diaper


 


  # Voids 3  














- Exam





General:  The patient is awake and alert, in no distress


Eye: there is normal conjunctiva bilaterally.  


Neck:  The neck is supple, there is no  JVD.  


Cardiovascular:  Normal  S1-S2, no S3-S4, no murmurs.


Respiratory: Lungs clear to auscultation bilaterally


Gastrointestinal: Abdomen is soft, nontender


Musculoskeletal:  There is no pedal edema.  


Neurological:. Speech is normal. 


Skin:  Skin is warm and dry 





- Labs


CBC & Chem 7: 


                                 04/12/21 06:54





                                 04/12/21 06:54


Labs: 


                  Abnormal Lab Results - Last 24 Hours (Table)











  04/11/21 04/11/21 04/11/21 Range/Units





  05:34 16:35 20:39 


 


WBC     (3.8-10.6)  k/uL


 


Neutrophils #     (1.3-7.7)  k/uL


 


Sodium     (137-145)  mmol/L


 


Glucose     (74-99)  mg/dL


 


POC Glucose (mg/dL)   262 H  240 H  (75-99)  mg/dL


 


Hemoglobin A1c  7.4 H    (4.0-6.0)  %














  04/12/21 04/12/21 04/12/21 Range/Units





  06:54 06:54 07:27 


 


WBC  14.1 H    (3.8-10.6)  k/uL


 


Neutrophils #  10.7 H    (1.3-7.7)  k/uL


 


Sodium   136 L   (137-145)  mmol/L


 


Glucose   156 H   (74-99)  mg/dL


 


POC Glucose (mg/dL)    150 H  (75-99)  mg/dL


 


Hemoglobin A1c     (4.0-6.0)  %














  04/12/21 Range/Units





  11:12 


 


WBC   (3.8-10.6)  k/uL


 


Neutrophils #   (1.3-7.7)  k/uL


 


Sodium   (137-145)  mmol/L


 


Glucose   (74-99)  mg/dL


 


POC Glucose (mg/dL)  190 H  (75-99)  mg/dL


 


Hemoglobin A1c   (4.0-6.0)  %








                      Microbiology - Last 24 Hours (Table)











 04/10/21 18:57 Blood Culture - Preliminary





 Blood    No Growth after 24 hours


 


 04/10/21 18:58 Blood Culture - Preliminary





 Blood    No Growth after 24 hours














Assessment and Plan


Assessment: 





This is a 86-year-old male with past medical history noted below who presented 

to the emergency room with worsening shortness of breath and hypoxia.  Patient 

was evaluated in the ER and admitted to the hospital for further management of 

his medical problems noted below.





1. COVID-19 pneumonia, patient was seen and evaluated by pulmonology.  Started 

on IV Decadron 6 mg daily day #2.  Continue vitamin C, vitamin D, zinc, and 

melatonin.





2.  Acute hypoxic respiratory failure, and wean off O2 as tolerated for O2 sat 

greater than 90%





3.  Hypomagnesemia, replaced.  Started on magnesium oxide 400 mg daily





4.  Chronic atrial fibrillation on anticoagulation with Eliquis





5.  Type 2 diabetes, continue home dose of insulin plus sliding scale





6.  Physical debility, PT/OT consulted for further evaluation





7.  Chronic medical problems: Coronary artery disease, history of CABG, 

underlying cognitive impairment/dementia, underlying depression, chronic 

systolic heart failure with no evidence of exacerbation





Today, I reviewed his medication list and lab work results.  Inflammatory 

markers only slightly elevated.  We will continue supportive care.  Repeat lab 

work in the morning.  Wean off O2 as tolerated.

## 2021-04-13 VITALS
SYSTOLIC BLOOD PRESSURE: 153 MMHG | TEMPERATURE: 98.3 F | DIASTOLIC BLOOD PRESSURE: 63 MMHG | RESPIRATION RATE: 18 BRPM | HEART RATE: 62 BPM

## 2021-04-13 LAB
GLUCOSE BLD-MCNC: 241 MG/DL (ref 75–99)
GLUCOSE BLD-MCNC: 272 MG/DL (ref 75–99)

## 2021-04-13 RX ADMIN — INSULIN ASPART SCH UNIT: 100 INJECTION, SOLUTION INTRAVENOUS; SUBCUTANEOUS at 07:56

## 2021-04-13 RX ADMIN — SOTALOL HYDROCHLORIDE SCH MG: 80 TABLET ORAL at 07:49

## 2021-04-13 RX ADMIN — MEMANTINE HYDROCHLORIDE SCH MG: 10 TABLET ORAL at 07:50

## 2021-04-13 RX ADMIN — Medication SCH MG: at 07:49

## 2021-04-13 RX ADMIN — APIXABAN SCH MG: 5 TABLET, FILM COATED ORAL at 07:49

## 2021-04-13 RX ADMIN — ASPIRIN 81 MG CHEWABLE TABLET SCH MG: 81 TABLET CHEWABLE at 07:54

## 2021-04-13 RX ADMIN — INSULIN DETEMIR SCH UNIT: 100 INJECTION, SOLUTION SUBCUTANEOUS at 07:56

## 2021-04-13 RX ADMIN — Medication SCH MCG: at 07:49

## 2021-04-13 RX ADMIN — INSULIN ASPART SCH UNIT: 100 INJECTION, SOLUTION INTRAVENOUS; SUBCUTANEOUS at 12:51

## 2021-04-13 RX ADMIN — ALBUTEROL SULFATE SCH: 90 AEROSOL, METERED RESPIRATORY (INHALATION) at 15:03

## 2021-04-13 RX ADMIN — DEXTROSE SCH MG: 50 INJECTION, SOLUTION INTRAVENOUS at 07:56

## 2021-04-13 RX ADMIN — ALBUTEROL SULFATE SCH PUFF: 90 AEROSOL, METERED RESPIRATORY (INHALATION) at 08:16

## 2021-04-13 RX ADMIN — FUROSEMIDE SCH MG: 40 TABLET ORAL at 07:49

## 2021-04-13 RX ADMIN — OXYCODONE HYDROCHLORIDE AND ACETAMINOPHEN SCH MG: 500 TABLET ORAL at 07:49

## 2021-04-13 NOTE — P.PN
Subjective


Progress Note Date: 04/13/21


Principal diagnosis: 





Acute respiratory failure with hypoxia secondary to Covid Pnuemonia








Hospital course:





Patient is an 86-year-old male with a past medical history of CAD with previous 

CABG, atrial fibrillation on Eliquis, hypertension, hyperlipidemia, chronic 

systolic heart failure, insulin-dependent diabetes mellitus type 2, and dementia

with cognitive impairment.  Patient presented to the hospital on 4/10/21 in 

acute respiratory distress requiring oxygen supplementation.  He was diagnosed 

with Covid 19 virus infection with onset of symptoms being unknown.  Chest x-ray

was positive for bilateral infiltrates. Patient admitted under our services for 

continued close medical management along with consultation to pulmonology.  

During patient's stay, his respiratory status has significantly improved and he 

has been completely weaned off of all supplemental oxygen.  Patient has 

exhibited some increased weakness which did result in a fall during this 

hospital stay.  Patient was evaluated and had a lumbar spine x-ray which was 

negative for acute displaced fracture in the lumbar spine.  Patient was 

evaluated by PT/OT and they are recommending subacute rehabilitation placement.








Physical exam:





4/13/21 : Patient was seen and fully evaluated at the bedside. He reports 

feeling well this morning. He was evaluated by PT and did have some episodes of 

loss of balance and they are recommending sub-acute rehab placement. Pt has been

weaned off of oxygen and is currently doing well on room air with SPO2 92-93%.  

Patient denies having any headache, lightheadedness, dizziness, chest pain, 

palpitations, or feeling short of breath.





General: non toxic, no distress, appears at stated age


Derm: warm, dry


Head: atraumatic, normocephalic, symmetric. Patient very hard of hearing.. 


Eyes: EOMI, no lid lag, anicteric sclera


Mouth: no lip lesion, mucus membranes moist


Cardiovascular: S1S2 reg, no murmur, positive posterior tibial pulses 

bilaterally,  cap refill less than 2 seconds.  


Lungs: Respirations even, regular, and unlabored on room air.  Lungs clear to 

auscultation with soft crackles at bilateral lower lobes.  No accessory muscle 

usage.


Abdominal: Obese abdomen soft, nontender to palpation, no guarding, no 

appreciable organomegaly


Ext: no gross muscle atrophy, 1+ bilateral lower extremity edema, no 

contractures


Neuro: Patient alert to person, place, and time.  He has a history of dementia 

and is a poor historian. 


Psych: Alert, oriented, appropriate affect 








Plan of care:





Acute respiratory failure with hypoxia secondary to Covid Pnuemonia


-Covid 19 PCR positive on 4/10/21


-Chest x-ray revealing bilateral infiltrates


-CTA chest showing bilateral diffuse patchy groundglass opacities predominantly 

in the lower lobes and concerning for Covid pneumonia.  Negative for PE.


-Decadron 6 mg daily, today is day 3 of 10 for steroids.  We will change to oral

steroids.


-Oxygenation has been successfully weaned, patient currently 93% room air


-Encourage use of Incentive Spirometry 10-15 times hourly while awake


-Continue melatonin, vitamin C, vitamin D, and zinc


-Pulmonology following.  Appreciate further recommendations.





Weakness resulting in fall and unsteady gait


-Lumbar spine x-ray was negative for acute displaced fracture in the lumbar 

spine.  


-PT/OT following, recommending subacute rehab.


-Fall precautions and patient to be provided with assistance as needed.  


-Placement in SNF post discharge 





Chronic systolic heart failure, not in exacerbation


-Echocardiogram completed 10/13/20 revealed moderate concentric LVH, mildly 

impaired EF of 40-45%, moderate aortic valve sclerosis and mild mitral and 

tricuspid regurgitation.


-Continue daily Lasix 40 mg





Paroxysmal Atrial fibrillation on anticoagulation with Eliquis


-EKG showing normal sinus rhythm at 74 bpm with T-wave inversion in leads aVL 

and no noted ST abnormalities.


-Continue anticoagulation with  Eliquis


-Continue sotalol 80 mg twice daily 





CAD with history of CABG


-EKG showing normal sinus rhythm at 74 bpm with T-wave inversion in leads aVL 

and no noted ST abnormalities.


-Troponin < 0.012





Hypertension


-Monitor vital signs and continue daily medication regimen with lisinopril and 

sotalol. 





Hyperlipidemia


-Continue daily medication regimen with atorvastatin 40 mg nightly 


-Heart healthy carb consistent diet 





Diabetes mellitus type 2


-Hold metformin and Januvia and continue glycemic protocol with NovoLog sliding 

scale.  


-Heart healthy carb consistent diet.  





Dementia


-Continue daily medication management with Aricept 10 mg nightly and memantine 

28 mg daily.


-Provide assistance and redirection as needed.








CODE STATUS: DO NOT RESUSCITATE


DVT prophylaxis: Eliquis


Discussed with: Patient, RN, and patient's daughter on telephone 


Anticipated discharge date: Tomorrow 


Anticipated discharge place: SNF 


A total of 45 minutes was spent on the care of this complex patient more than 

50% of the time was spent in counseling and care coordination.








Objective





- Vital Signs


Vital signs: 


                                   Vital Signs











Temp  97.6 F   04/13/21 08:00


 


Pulse  67   04/13/21 08:00


 


Resp  20   04/13/21 08:00


 


BP  162/75   04/13/21 08:00


 


Pulse Ox  93 L  04/13/21 08:00








                                 Intake & Output











 04/12/21 04/13/21 04/13/21





 18:59 06:59 18:59


 


Intake Total  1000 


 


Balance  1000 


 


Intake:   


 


  Oral  1000 


 


Other:   


 


  Voiding Method Urinal  





 Diaper  


 


  # Voids 3 7 


 


  # Bowel Movements 1  














- Labs


CBC & Chem 7: 


                                 04/12/21 06:54





                                 04/12/21 06:54


Labs: 


                  Abnormal Lab Results - Last 24 Hours (Table)











  04/12/21 04/12/21 04/12/21 Range/Units





  11:12 17:06 19:44 


 


POC Glucose (mg/dL)  190 H  310 H  302 H  (75-99)  mg/dL














  04/13/21 Range/Units





  07:29 


 


POC Glucose (mg/dL)  241 H  (75-99)  mg/dL








                      Microbiology - Last 24 Hours (Table)











 04/10/21 18:57 Blood Culture - Preliminary





 Blood    No Growth after 48 hours


 


 04/10/21 18:58 Blood Culture - Preliminary





 Blood    No Growth after 48 hours

## 2021-04-13 NOTE — P.DS
Providers


Date of admission: 


04/10/21 22:41





Expected date of discharge: 04/13/21


Attending physician: 


Josselin Freedman MD





Consults: 





                                        





04/10/21 22:41


Consult Physician Urgent 


   Consulting Provider: Maurizio hTomas


   Consult Reason/Comments: pneumonia, COPD


   Do you want consulting provider notified?: Yes











Primary care physician: 


Physician Nonstaff





Hospital Course: 





Discharge diagnoses:


Acute respiratory failure with hypoxia secondary to Covid Pnuemonia





Weakness resulting in fall and unsteady gait





Chronic systolic heart failure, not in exacerbation





Paroxysmal Atrial fibrillation on anticoagulation with Eliquis





CAD with history of CABG





Hypertension





Hyperlipidemia





Diabetes mellitus type 2





Dementia








Hospital course:





Patient is an 86-year-old male with a past medical history of CAD with previous 

CABG, atrial fibrillation on Eliquis, hypertension, hyperlipidemia, chronic 

systolic heart failure, insulin-dependent diabetes mellitus type 2, and dementia

with cognitive impairment.  Patient presented to the hospital on 4/10/21 in 

acute respiratory distress requiring oxygen supplementation.  He was diagnosed 

with Covid 19 virus infection with onset of symptoms being unknown.  Chest x-ray

was positive for bilateral infiltrates. Patient admitted under our services for 

continued close medical management along with consultation to pulmonology.  

During patient's stay, his respiratory status has significantly improved and he 

has been completely weaned off of all supplemental oxygen.  Patient has 

exhibited some increased weakness which did result in a fall during this 

hospital stay.  Patient was evaluated and had a lumbar spine x-ray which was 

negative for acute displaced fracture in the lumbar spine.  Patient was 

evaluated by PT/OT and they are recommending discharge reporting patient needs 

minimal assistance and/or supervision and encourage patient to increase 

ambulation and standing balance as tolerated.  Case management discussed with 

patient's daughter the recommendation for home care, daughter declining at this 

time stating that she will work with her father and if home care was later 

determined as needed she will discuss further with his PCP.  Patient's condition

stable at this time.  Ambulatory pulse oximetry 94% on room air and 95% at rest.

 Patient being discharged home on 7 more days of Decadron for a total of 10 days

of oral steroids, zinc, melatonin, vitamin C, vitamin D, and a Ventolin inhaler 

to be used as needed for shortness of breath and/or wheezing.  Patient to 

follow-up with his PCP as discussed in the next 1-2 days and follow up with 

pulmonology in one week.  Patient stable for discharge home with his daughter at

this time.








Physical exam:





Please refer to progress note completed earlier today to review a full detailed 

physical examination.











A total of 45 minutes of time were spent preparing this complex discharge 

summary.








Patient Condition at Discharge: Stable





Plan - Discharge Summary


Discharge Rx Participant: Yes


New Discharge Prescriptions: 


New


   dexAMETHasone ORAL [Hexadrol] 6 mg PO DAILY 7 Days #21 tab


   Albuterol Inhaler [Ventolin Hfa Inhaler] 2 puff INHALATION QID PRN 30 Days #1

inhaler


     PRN Reason: Shortness Of Breath Or Wheezing


   Ascorbic Acid [Vitamin C] 1,000 mg PO DAILY 30 Days #30 tab


   Melatonin 5 mg PO HS 30 Days #30 tablet


   Zinc Sulfate [Orazinc] 220 mg PO DAILY 30 Days #30 cap





Continue


   Nitroglycerin [Nitroglycerin 400MCG Spray] 1 spray TRANSLINGU Q5M PRN


     PRN Reason: Chest Pain


   Nitroglycerin Sl Tabs [Nitrostat] 0.4 mg SL Q5M PRN


     PRN Reason: Chest Pain


   Acetaminophen/Diphenhydramine [Tylenol -25mg] 1 tab PO HS


   Ubidecarenone [Co Q-10] 100 mg PO HS


   Sertraline HCl [Zoloft] 50 mg PO HS


   Donepezil [Aricept] 10 mg PO HS


   Cholecalciferol [Vitamin D3 (25 Mcg = 1000 Iu)] 50 mcg PO BID


   Atorvastatin [Lipitor] 40 mg PO HS


   Sotalol [Betapace] 80 mg PO BID


   Apixaban [Eliquis] 5 mg PO BID


   metFORMIN HCL [Glucophage] 500 mg PO DAILY


   sitaGLIPtin [Januvia] 100 mg PO DAILY


   Furosemide [Lasix] 40 mg PO DAILY


   Ferrous Sulfate [Iron (65 MG Elemental)] 325 mg PO Q48H


   Canagliflozin [Invokana] 300 mg PO DAILY


   Vitamin B Complex 1 cap PO DAILY


   Omeprazole Magnesium [PriLOSEC OTC] 20 mg PO DAILY


   Insulin Glargine,Hum.rec.anlog [Lantus Solostar] 58 unit SQ DAILY


   Aspirin 81 mg PO DAILY


   lisinopriL [Zestril] 2.5 mg PO DAILY


   Memantine HCl [Memantine HCl ER] 28 mg PO DAILY


   Ketoconazole 2% Cream [Nizoral 2%] 1 applic TOPICAL DAILY PRN


     PRN Reason: rash on leg


   Ammonium Lactate 12%/Aquaphor  1 applic TOPICAL DAILY PRN


     PRN Reason: rash on leg


   Clotrimazole/Betamethasone Dip [Lotrisone Cream] 1 applic TOPICAL DAILY


   Cetirizine HCl [Zyrtec] 10 mg PO HS


Discharge Medication List





Acetaminophen/Diphenhydramine [Tylenol -25mg] 1 tab PO HS 10/12/20 

[History]


Ammonium Lactate 12%/Aquaphor  1 applic TOPICAL DAILY PRN 10/12/20 [History]


Apixaban [Eliquis] 5 mg PO BID 10/12/20 [History]


Aspirin 81 mg PO DAILY 10/12/20 [History]


Atorvastatin [Lipitor] 40 mg PO HS 10/12/20 [History]


Canagliflozin [Invokana] 300 mg PO DAILY 10/12/20 [History]


Cholecalciferol [Vitamin D3 (25 Mcg = 1000 Iu)] 50 mcg PO BID 10/12/20 [History]


Donepezil [Aricept] 10 mg PO HS 10/12/20 [History]


Ferrous Sulfate [Iron (65 MG Elemental)] 325 mg PO Q48H 10/12/20 [History]


Furosemide [Lasix] 40 mg PO DAILY 10/12/20 [History]


Insulin Glargine,Hum.rec.anlog [Lantus Solostar] 58 unit SQ DAILY 10/12/20 

[History]


Ketoconazole 2% Cream [Nizoral 2%] 1 applic TOPICAL DAILY PRN 10/12/20 [History]


Memantine HCl [Memantine HCl ER] 28 mg PO DAILY 10/12/20 [History]


Nitroglycerin Sl Tabs [Nitrostat] 0.4 mg SL Q5M PRN 10/12/20 [History]


Nitroglycerin [Nitroglycerin 400MCG Spray] 1 spray TRANSLINGU Q5M PRN 10/12/20 

[History]


Omeprazole Magnesium [PriLOSEC OTC] 20 mg PO DAILY 10/12/20 [History]


Sertraline HCl [Zoloft] 50 mg PO HS 10/12/20 [History]


Sotalol [Betapace] 80 mg PO BID 10/12/20 [History]


Ubidecarenone [Co Q-10] 100 mg PO HS 10/12/20 [History]


Vitamin B Complex 1 cap PO DAILY 10/12/20 [History]


lisinopriL [Zestril] 2.5 mg PO DAILY 10/12/20 [History]


metFORMIN HCL [Glucophage] 500 mg PO DAILY 10/12/20 [History]


sitaGLIPtin [Januvia] 100 mg PO DAILY 10/12/20 [History]


Cetirizine HCl [Zyrtec] 10 mg PO HS 04/10/21 [History]


Clotrimazole/Betamethasone Dip [Lotrisone Cream] 1 applic TOPICAL DAILY 04/10/21

[History]


Albuterol Inhaler [Ventolin Hfa Inhaler] 2 puff INHALATION QID PRN 30 Days #1 

inhaler 04/13/21 [Rx]


Ascorbic Acid [Vitamin C] 1,000 mg PO DAILY 30 Days #30 tab 04/13/21 [Rx]


Melatonin 5 mg PO HS 30 Days #30 tablet 04/13/21 [Rx]


Zinc Sulfate [Orazinc] 220 mg PO DAILY 30 Days #30 cap 04/13/21 [Rx]


dexAMETHasone ORAL [Hexadrol] 6 mg PO DAILY 7 Days #21 tab 04/13/21 [Rx]








Follow up Appointment(s)/Referral(s): 


Maurizio Thomas DO [Doctor of Osteopathic Medicine] - 1 Week


None,Stated [REFERRING] - 1-2 days (Please follow up with your Primary Care 

Physician in his office in the next 1-2 days for follow up evaluation. )


Activity/Diet/Wound Care/Special Instructions: 





Activity:





As tolerated





Diet: 





Heart healthy carb consistent diet





Special Instructions: 





Please follow up with her PCP as discussed in the next 1-2 days and with 

pulmonology, Dr. Thomas in 1 week.





You have tested positive for Covid 19 virus infection, it is important to self 

quarantine for a total of 14 days from onset of symptoms and/or 10 days from 

positive test results. You tested positive for Covid 19 virus infection on 

4/10/21.








Discharge Disposition: HOME SELF-CARE

## 2021-04-13 NOTE — P.PN
Subjective


Progress Note Date: 04/13/21








 86-year-old white male patient who is a poor historian, presented to the 

emergency department on 04/10/2021 per EMS for evaluation of worsening shortness

of breath, when EMS arrived he was hypoxemic at the scene, and having 

respiratory distress, he was placed on BiPAP support, and transported to the [facility] department.  Apparently one of the patient's family members recently 

tested positive for COVID 19.  Chest x-ray showed bilateral infiltrates.  CT 

chest showed no acute pulmonary embolism, but did show bilateral diffuse patchy 

groundglass opacities predominantly in the lower lobes concerning for COVID 

pneumonia, initial COVID 19 PCR the emergency department on 04/10/2021 was 

negative, and a repeat PCR test on 04/11/2021 was found to be positive.  His 

admission blood work showed a white blood cell, 19.5, hemoglobin 16.7, d-dimer 

0.79, sodium is 136, potassium is 4.9, chloride is 106, CO2 is 20, BUN is 12, 

creatinine 0.73, lactic acid was 1.7, LFTs within normal limits, troponin was 

negative at less than 0.012, CRP was 19.5, proBNP was 2450, progesterone level 

was negative at 0.10.  Patient states that he states he was vaccinated for COVD 

19, he states that his last dose was 2 months ago.  His breathing has improved, 

he is currently off BiPAP support which was previously at 14/5 and FiO2 of 30%, 

was given a dose of Levaquin, and was after switched to azithromycin, currently 

breathing comfortably, he is on 3 L of oxygen and his pulse ox is 98%, no 

wheezing or chest congestion, occasional cough, breathing comfortably.  His time

on of symptoms is unclear.  The patient had a about a fall yesterday when he 

fell off the commode and the patient is going to undergo a x-ray of the lumbar 

spine. 





On today's evaluation of 04/13/2021, the patient is on room air oxygen with a 

pulse ox of 96%.  Doing well.  No specific complaints.  He was weaned off the 

oxygen yesterday.  He is afebrile.  The blood work showing some mild steroid-

induced hyperglycemia.  Rest of the blood work is from yesterday and everything 

was within normal limits.  He is active.  He has no other complaints otherwise. 

She remains on Decadron milligrams IV every 24 hours.  He is on Levemir insulin 

50 units daily and is also on long-term articulation with Eliquis.








Objective





- Vital Signs


Vital signs: 


                                   Vital Signs











Temp  98 F   04/13/21 04:00


 


Pulse  61   04/13/21 04:00


 


Resp  17   04/13/21 04:00


 


BP  138/61   04/13/21 04:00


 


Pulse Ox  96   04/13/21 04:00








                                 Intake & Output











 04/12/21 04/13/21 04/13/21





 18:59 06:59 18:59


 


Intake Total  1000 


 


Balance  1000 


 


Intake:   


 


  Oral  1000 


 


Other:   


 


  Voiding Method Urinal  





 Diaper  


 


  # Voids 3 7 


 


  # Bowel Movements 1  














- Exam











 GENERAL EXAM: Alert, pleasant, 86-year-old white male, currently on room air 

oxygen  


HEAD: Normocephalic/atraumatic.


EYES: Normal reaction of pupils, equal size.  Conjunctiva pink, sclera white.


NOSE: Clear with pink turbinates.


THROAT: No erythema or exudates.


NECK: No masses, no JVD, no thyroid enlargement, no adenopathy.


CHEST: No chest wall deformity.  Symmetrical expansion. 


LUNGS: Equal air entry with bilateral crackles, no wheezes


CVS: Regular rate and rhythm, normal S1 and S2, no gallops, no murmurs, no rubs


ABDOMEN: Soft, nontender.  No hepatosplenomegaly, normal bowel sounds, no 

guarding or rigidity.


EXTREMITIES: No clubbing, no edema, no cyanosis, 2+ pulses and upper and lower 

extremities.


MUSCULOSKELETAL: Muscle strength and tone normal.


SPINE: No scoliosis or deformity


SKIN: No rashes


CENTRAL NERVOUS SYSTEM: Alert and oriented -2.  No focal deficits, tone is 

normal in all 4 extremities.





- Labs


CBC & Chem 7: 


                                 04/12/21 06:54





                                 04/12/21 06:54


Labs: 


                  Abnormal Lab Results - Last 24 Hours (Table)











  04/12/21 04/12/21 04/12/21 Range/Units





  11:12 17:06 19:44 


 


POC Glucose (mg/dL)  190 H  310 H  302 H  (75-99)  mg/dL














  04/13/21 Range/Units





  07:29 


 


POC Glucose (mg/dL)  241 H  (75-99)  mg/dL








                      Microbiology - Last 24 Hours (Table)











 04/10/21 18:57 Blood Culture - Preliminary





 Blood    No Growth after 48 hours


 


 04/10/21 18:58 Blood Culture - Preliminary





 Blood    No Growth after 48 hours














Assessment and Plan


Plan: 











#1.  Acute hypoxic respiratory failure related to acute COVID 19 pneumonia, 

timeline of symptom onset is unclear, currently on room air oxygen.   Clinically

improved as the patient is improving from Covid 19related pneumonia.





#2.  History of A. fib, currently in sinus mechanism, on Eliquis





#3.  Diabetes mellitus type 2 , with a component of steroid-induced 

hyperglycemia





#4.  Hypertension





#5.  Previous history of CHF, with systolic dysfunction





#6.  Coronary artery disease with previous history of bypass grafting





#7.  Depression





#8.  History of dementia





#9, fall





Plan:








Continue Decadron, completed total of 10 day course


Continue long-term and to coagulation with Eliquis as the patient has history of

chronic atrial fibrillation


X-ray of the lumbosacral spine shows no evidence of any fracture and there is 

arthritis, chronic


Currently on room air oxygen


Monitoring telemetry monitors


Monitor blood sugars supportive care including multivitamins


DNR/DNI CODE STATUS


 will sign off the case

## 2021-08-09 ENCOUNTER — HOSPITAL ENCOUNTER (EMERGENCY)
Dept: HOSPITAL 47 - EC | Age: 86
Discharge: HOME | End: 2021-08-09
Payer: MEDICARE

## 2021-08-09 VITALS
TEMPERATURE: 97.3 F | DIASTOLIC BLOOD PRESSURE: 67 MMHG | RESPIRATION RATE: 17 BRPM | SYSTOLIC BLOOD PRESSURE: 142 MMHG | HEART RATE: 67 BPM

## 2021-08-09 DIAGNOSIS — Z79.84: ICD-10-CM

## 2021-08-09 DIAGNOSIS — Z95.1: ICD-10-CM

## 2021-08-09 DIAGNOSIS — R07.2: Primary | ICD-10-CM

## 2021-08-09 DIAGNOSIS — Z79.82: ICD-10-CM

## 2021-08-09 DIAGNOSIS — Z88.0: ICD-10-CM

## 2021-08-09 DIAGNOSIS — E11.9: ICD-10-CM

## 2021-08-09 DIAGNOSIS — R53.1: ICD-10-CM

## 2021-08-09 DIAGNOSIS — I50.9: ICD-10-CM

## 2021-08-09 DIAGNOSIS — R00.2: ICD-10-CM

## 2021-08-09 DIAGNOSIS — Z79.899: ICD-10-CM

## 2021-08-09 DIAGNOSIS — F03.90: ICD-10-CM

## 2021-08-09 LAB
ALBUMIN SERPL-MCNC: 3.1 G/DL (ref 3.5–5)
ALP SERPL-CCNC: 79 U/L (ref 38–126)
ALT SERPL-CCNC: 14 U/L (ref 4–49)
ANION GAP SERPL CALC-SCNC: 7 MMOL/L
APTT BLD: 22.8 SEC (ref 22–30)
AST SERPL-CCNC: 11 U/L (ref 17–59)
BASOPHILS # BLD AUTO: 0.1 K/UL (ref 0–0.2)
BASOPHILS NFR BLD AUTO: 1 %
BUN SERPL-SCNC: 12 MG/DL (ref 9–20)
CALCIUM SPEC-MCNC: 9.6 MG/DL (ref 8.4–10.2)
CHLORIDE SERPL-SCNC: 108 MMOL/L (ref 98–107)
CK SERPL-CCNC: 48 U/L (ref 55–170)
CO2 SERPL-SCNC: 21 MMOL/L (ref 22–30)
EOSINOPHIL # BLD AUTO: 0.2 K/UL (ref 0–0.7)
EOSINOPHIL NFR BLD AUTO: 2 %
ERYTHROCYTE [DISTWIDTH] IN BLOOD BY AUTOMATED COUNT: 4.85 M/UL (ref 4.3–5.9)
ERYTHROCYTE [DISTWIDTH] IN BLOOD: 14.5 % (ref 11.5–15.5)
GLUCOSE SERPL-MCNC: 176 MG/DL (ref 74–99)
HCT VFR BLD AUTO: 45.1 % (ref 39–53)
HGB BLD-MCNC: 14.3 GM/DL (ref 13–17.5)
INR PPP: 1 (ref ?–1.2)
LYMPHOCYTES # SPEC AUTO: 2.1 K/UL (ref 1–4.8)
LYMPHOCYTES NFR SPEC AUTO: 19 %
MAGNESIUM SPEC-SCNC: 1.7 MG/DL (ref 1.6–2.3)
MCH RBC QN AUTO: 29.5 PG (ref 25–35)
MCHC RBC AUTO-ENTMCNC: 31.7 G/DL (ref 31–37)
MCV RBC AUTO: 93 FL (ref 80–100)
MONOCYTES # BLD AUTO: 0.5 K/UL (ref 0–1)
MONOCYTES NFR BLD AUTO: 5 %
NEUTROPHILS # BLD AUTO: 8.1 K/UL (ref 1.3–7.7)
NEUTROPHILS NFR BLD AUTO: 73 %
PLATELET # BLD AUTO: 241 K/UL (ref 150–450)
POTASSIUM SERPL-SCNC: 4 MMOL/L (ref 3.5–5.1)
PROT SERPL-MCNC: 5.6 G/DL (ref 6.3–8.2)
PT BLD: 10.5 SEC (ref 9–12)
SODIUM SERPL-SCNC: 136 MMOL/L (ref 137–145)
WBC # BLD AUTO: 11.1 K/UL (ref 3.8–10.6)

## 2021-08-09 PROCEDURE — 83735 ASSAY OF MAGNESIUM: CPT

## 2021-08-09 PROCEDURE — 85610 PROTHROMBIN TIME: CPT

## 2021-08-09 PROCEDURE — 85025 COMPLETE CBC W/AUTO DIFF WBC: CPT

## 2021-08-09 PROCEDURE — 84100 ASSAY OF PHOSPHORUS: CPT

## 2021-08-09 PROCEDURE — 71046 X-RAY EXAM CHEST 2 VIEWS: CPT

## 2021-08-09 PROCEDURE — 80053 COMPREHEN METABOLIC PANEL: CPT

## 2021-08-09 PROCEDURE — 83880 ASSAY OF NATRIURETIC PEPTIDE: CPT

## 2021-08-09 PROCEDURE — 82550 ASSAY OF CK (CPK): CPT

## 2021-08-09 PROCEDURE — 84484 ASSAY OF TROPONIN QUANT: CPT

## 2021-08-09 PROCEDURE — 85730 THROMBOPLASTIN TIME PARTIAL: CPT

## 2021-08-09 PROCEDURE — 93005 ELECTROCARDIOGRAM TRACING: CPT

## 2021-08-09 PROCEDURE — 99285 EMERGENCY DEPT VISIT HI MDM: CPT

## 2021-08-09 NOTE — ED
Chest Pain HPI





- General


Stated Complaint: Chest Pain


Time Seen by Provider: 08/09/21 02:26


Source: RN notes reviewed, old records reviewed


Limitations: no limitations





- History of Present Illness


Initial Comments: 





This is a 87-year-old male to the emergency department today for evaluation of 

chest pain.  Chest pain weakness.  Patient does have a recent diagnosis of 

coronavirus.  A she has no complaints on arrival to the emergency department 

tonight.  Chest pain was earlier.  No recent fevers though, no cough congestion 

patient has no shortness of breath.


MD Complaint: chest pain


-: hour(s)


Onset: during rest


Pain Location: substernal


Pain Radiation: none


Severity: mild


Severity scale (1-10): 1


Quality: tightness


Consistency: intermittent, now resolved


Improves With: nothing


Worsens With: nothing


Context: other (none)


Anginal Symptoms: other (none)


Other Symptoms: palpitations


Treatments Prior to Arrival: none





- Related Data


                                Home Medications











 Medication  Instructions  Recorded  Confirmed


 


Acetaminophen/Diphenhydramine 1 tab PO HS 10/12/20 04/10/21





[Tylenol -25mg]   


 


Ammonium Lactate 12%/Aquaphor  1 applic TOPICAL DAILY PRN 10/12/20 04/10/21


 


Apixaban [Eliquis] 5 mg PO BID 10/12/20 04/10/21


 


Aspirin 81 mg PO DAILY 10/12/20 04/10/21


 


Atorvastatin [Lipitor] 40 mg PO HS 10/12/20 04/10/21


 


Canagliflozin [Invokana] 300 mg PO DAILY 10/12/20 04/10/21


 


Cholecalciferol [Vitamin D3 (25 50 mcg PO BID 10/12/20 04/10/21





Mcg = 1000 Iu)]   


 


Donepezil [Aricept] 10 mg PO HS 10/12/20 04/10/21


 


Ferrous Sulfate [Iron (65  mg PO Q48H 10/12/20 04/10/21





Elemental)]   


 


Furosemide [Lasix] 40 mg PO DAILY 10/12/20 04/10/21


 


Insulin Glargine,Hum.rec.anlog 58 unit SQ DAILY 10/12/20 04/10/21





[Lantus Solostar Pen]   


 


Ketoconazole 2% Cream [Nizoral 2%] 1 applic TOPICAL DAILY PRN 10/12/20 04/10/21


 


Memantine HCl [Memantine HCl ER] 28 mg PO DAILY 10/12/20 04/10/21


 


Nitroglycerin Sl Tabs [Nitrostat] 0.4 mg SL Q5M PRN 10/12/20 04/10/21


 


Nitroglycerin [Nitroglycerin 1 spray TRANSLINGU Q5M PRN 10/12/20 04/10/21





400MCG Spray]   


 


Omeprazole Magnesium [PriLOSEC OTC] 20 mg PO DAILY 10/12/20 04/10/21


 


Sertraline HCl [Zoloft] 50 mg PO HS 10/12/20 04/10/21


 


Sotalol [Betapace] 80 mg PO BID 10/12/20 04/10/21


 


Ubidecarenone [Co Q-10] 100 mg PO HS 10/12/20 04/10/21


 


Vitamin B Complex 1 cap PO DAILY 10/12/20 04/10/21


 


lisinopriL [Zestril] 2.5 mg PO DAILY 10/12/20 04/10/21


 


metFORMIN HCL [Glucophage] 500 mg PO DAILY 10/12/20 04/10/21


 


sitaGLIPtin [Januvia] 100 mg PO DAILY 10/12/20 04/10/21


 


Cetirizine HCl [Zyrtec] 10 mg PO HS 04/10/21 04/10/21


 


Clotrimazole/Betamethasone Dip 1 applic TOPICAL DAILY 04/10/21 04/10/21





[Lotrisone Cream]   








                                  Previous Rx's











 Medication  Instructions  Recorded


 


Albuterol Inhaler [Ventolin Hfa 2 puff INHALATION QID PRN 30 Days 04/13/21





Inhaler] #1 inhaler 


 


Ascorbic Acid [Vitamin C] 1,000 mg PO DAILY 30 Days #30 tab 04/13/21


 


Melatonin 5 mg PO HS 30 Days #30 tablet 04/13/21


 


Zinc Sulfate [Orazinc] 220 mg PO DAILY 30 Days #30 cap 04/13/21


 


dexAMETHasone ORAL [Hexadrol] 6 mg PO DAILY 7 Days #21 tab 04/13/21











                                    Allergies











Allergy/AdvReac Type Severity Reaction Status Date / Time


 


Penicillins Allergy  Unknown Verified 04/10/21 20:32





   Childhood  














Review of Systems


ROS Statement: 


Those systems with pertinent positive or pertinent negative responses have been 

documented in the HPI.





ROS Other: All systems not noted in ROS Statement are negative.





EKG Findings





- EKG Comments:


EKG Findings:: EKG is sinus rhythm 65,   QTc 478





Past Medical History


Past Medical History: Atrial Fibrillation, Heart Failure, Dementia, Diabetes 

Mellitus


Additional Past Medical History / Comment(s): CHF


History of Any Multi-Drug Resistant Organisms: None Reported


Past Surgical History: Coronary Bypass/CABG


Additional Past Surgical History / Comment(s): Stents


Past Anesthesia/Blood Transfusion Reactions: No Reported Reaction


Past Psychological History: Depression


Smoking Status: Never smoker


Past Alcohol Use History: None Reported


Past Drug Use History: None Reported





- Past Family History


  ** Father


History Unknown: Yes





General Exam


General appearance: alert, in no apparent distress


Head exam: Present: atraumatic, normocephalic, normal inspection


Eye exam: Present: normal appearance, PERRL, EOMI.  Absent: scleral icterus, 

conjunctival injection, periorbital swelling


ENT exam: Present: normal exam, mucous membranes moist


Neck exam: Present: normal inspection.  Absent: tenderness, meningismus, 

lymphadenopathy


Respiratory exam: Present: normal lung sounds bilaterally.  Absent: respiratory 

distress, wheezes, rales, rhonchi, stridor


Cardiovascular Exam: Present: regular rate, normal rhythm, normal heart sounds. 

 Absent: systolic murmur, diastolic murmur, rubs, gallop, clicks


GI/Abdominal exam: Present: soft, normal bowel sounds.  Absent: distended, 

tenderness, guarding, rebound, rigid


Extremities exam: Present: normal inspection, full ROM, normal capillary refill.

  Absent: tenderness, pedal edema, joint swelling, calf tenderness


Back exam: Present: normal inspection


Neurological exam: Present: alert, oriented X3, CN II-XII intact


Psychiatric exam: Present: normal affect, normal mood


Skin exam: Present: warm, dry, intact, normal color.  Absent: rash





Course


                                   Vital Signs











  08/09/21





  02:40


 


Temperature 97.3 F L


 


Pulse Rate 67


 


Respiratory 17





Rate 


 


Blood Pressure 142/67


 


O2 Sat by Pulse 94 L





Oximetry 














- Reevaluation(s)


Reevaluation #1: 





08/09/21 


Record is reviewed





Patient symptoms are improved here in the ER





Patient is informed of results and questions are answered





Patient is in no distress








Chest Pain MDM





- MDM





87 male to the ER for chest pain.  Patient is in no distress with no active 

chest pain currently.  Patient prefers discharge at this time.





Disposition


Clinical Impression: 


 Chest pain, Weakness





Disposition: HOME SELF-CARE


Condition: Undetermined


Instructions (If sedation given, give patient instructions):  Chest Pain (ED)


Is patient prescribed a controlled substance at d/c from ED?: No


Referrals: 


None,Stated [Primary Care Provider] - 1-2 days

## 2021-08-09 NOTE — XR
EXAMINATION TYPE: XR chest 2V

 

DATE OF EXAM: 8/9/2021

 

COMPARISON: 4/10/2021

 

HISTORY: Weakness

 

TECHNIQUE: 2 views

 

FINDINGS: Heart is slightly enlarged. There are chest leads. There is no hilar masses. Thoracic aorta
 is atheromatous. There are sternal wires. There is no pleural effusion.

 

IMPRESSION: Mild cardiomegaly. There is clearing of the heart failure and pleural fluid compared to o
ld exam.